# Patient Record
Sex: FEMALE | Race: OTHER | HISPANIC OR LATINO | Employment: UNEMPLOYED | URBAN - METROPOLITAN AREA
[De-identification: names, ages, dates, MRNs, and addresses within clinical notes are randomized per-mention and may not be internally consistent; named-entity substitution may affect disease eponyms.]

---

## 2019-01-21 ENCOUNTER — HOSPITAL ENCOUNTER (EMERGENCY)
Facility: HOSPITAL | Age: 24
Discharge: HOME | End: 2019-01-21
Attending: EMERGENCY MEDICINE | Admitting: EMERGENCY MEDICINE
Payer: COMMERCIAL

## 2019-01-21 ENCOUNTER — APPOINTMENT (EMERGENCY)
Dept: RADIOLOGY | Facility: HOSPITAL | Age: 24
End: 2019-01-21
Payer: COMMERCIAL

## 2019-01-21 VITALS
OXYGEN SATURATION: 96 % | RESPIRATION RATE: 18 BRPM | HEART RATE: 85 BPM | SYSTOLIC BLOOD PRESSURE: 114 MMHG | TEMPERATURE: 98.4 F | DIASTOLIC BLOOD PRESSURE: 55 MMHG | WEIGHT: 262 LBS

## 2019-01-21 DIAGNOSIS — R05.9 COUGH: ICD-10-CM

## 2019-01-21 DIAGNOSIS — R09.89 RUNNY NOSE: ICD-10-CM

## 2019-01-21 DIAGNOSIS — R19.7 DIARRHEA: ICD-10-CM

## 2019-01-21 DIAGNOSIS — R51.9 HEADACHE: ICD-10-CM

## 2019-01-21 DIAGNOSIS — R11.2 NAUSEA AND VOMITING: Primary | ICD-10-CM

## 2019-01-21 LAB
ALBUMIN SERPL BCP-MCNC: 3.6 G/DL (ref 3.5–5)
ALP SERPL-CCNC: 116 U/L (ref 46–116)
ALT SERPL W P-5'-P-CCNC: 51 U/L (ref 12–78)
ANION GAP SERPL CALCULATED.3IONS-SCNC: 11 MMOL/L (ref 4–13)
AST SERPL W P-5'-P-CCNC: 29 U/L (ref 5–45)
ATRIAL RATE: 108 BPM
BASOPHILS # BLD AUTO: 0.02 THOUSANDS/ΜL (ref 0–0.1)
BASOPHILS NFR BLD AUTO: 0 % (ref 0–1)
BILIRUB SERPL-MCNC: 0.25 MG/DL (ref 0.2–1)
BILIRUB UR QL STRIP: NEGATIVE
BUN SERPL-MCNC: 21 MG/DL (ref 5–25)
CALCIUM SERPL-MCNC: 9.3 MG/DL (ref 8.3–10.1)
CHLORIDE SERPL-SCNC: 105 MMOL/L (ref 100–108)
CLARITY UR: CLEAR
CO2 SERPL-SCNC: 28 MMOL/L (ref 21–32)
COLOR UR: YELLOW
COLOR, POC: YELLOW
CREAT SERPL-MCNC: 0.95 MG/DL (ref 0.6–1.3)
EOSINOPHIL # BLD AUTO: 0.33 THOUSAND/ΜL (ref 0–0.61)
EOSINOPHIL NFR BLD AUTO: 3 % (ref 0–6)
ERYTHROCYTE [DISTWIDTH] IN BLOOD BY AUTOMATED COUNT: 12.9 % (ref 11.6–15.1)
EXT PREG TEST URINE: NEGATIVE
GFR SERPL CREATININE-BSD FRML MDRD: 85 ML/MIN/1.73SQ M
GLUCOSE SERPL-MCNC: 96 MG/DL (ref 65–140)
GLUCOSE UR STRIP-MCNC: NEGATIVE MG/DL
HCT VFR BLD AUTO: 38.5 % (ref 34.8–46.1)
HGB BLD-MCNC: 12.2 G/DL (ref 11.5–15.4)
HGB UR QL STRIP.AUTO: NEGATIVE
IMM GRANULOCYTES # BLD AUTO: 0.05 THOUSAND/UL (ref 0–0.2)
IMM GRANULOCYTES NFR BLD AUTO: 0 % (ref 0–2)
KETONES UR STRIP-MCNC: NEGATIVE MG/DL
LEUKOCYTE ESTERASE UR QL STRIP: NEGATIVE
LIPASE SERPL-CCNC: 157 U/L (ref 73–393)
LYMPHOCYTES # BLD AUTO: 2.58 THOUSANDS/ΜL (ref 0.6–4.47)
LYMPHOCYTES NFR BLD AUTO: 21 % (ref 14–44)
MCH RBC QN AUTO: 27.9 PG (ref 26.8–34.3)
MCHC RBC AUTO-ENTMCNC: 31.7 G/DL (ref 31.4–37.4)
MCV RBC AUTO: 88 FL (ref 82–98)
MONOCYTES # BLD AUTO: 0.51 THOUSAND/ΜL (ref 0.17–1.22)
MONOCYTES NFR BLD AUTO: 4 % (ref 4–12)
NEUTROPHILS # BLD AUTO: 8.66 THOUSANDS/ΜL (ref 1.85–7.62)
NEUTS SEG NFR BLD AUTO: 72 % (ref 43–75)
NITRITE UR QL STRIP: NEGATIVE
NRBC BLD AUTO-RTO: 0 /100 WBCS
P AXIS: 58 DEGREES
PH UR STRIP.AUTO: 7 [PH] (ref 4.5–8)
PLATELET # BLD AUTO: 238 THOUSANDS/UL (ref 149–390)
PMV BLD AUTO: 11.3 FL (ref 8.9–12.7)
POTASSIUM SERPL-SCNC: 3.4 MMOL/L (ref 3.5–5.3)
PR INTERVAL: 154 MS
PROT SERPL-MCNC: 8.6 G/DL (ref 6.4–8.2)
PROT UR STRIP-MCNC: NEGATIVE MG/DL
QRS AXIS: 55 DEGREES
QRSD INTERVAL: 76 MS
QT INTERVAL: 338 MS
QTC INTERVAL: 452 MS
RBC # BLD AUTO: 4.37 MILLION/UL (ref 3.81–5.12)
SODIUM SERPL-SCNC: 144 MMOL/L (ref 136–145)
SP GR UR STRIP.AUTO: 1.02 (ref 1–1.03)
T WAVE AXIS: 15 DEGREES
UROBILINOGEN UR QL STRIP.AUTO: 1 E.U./DL
VENTRICULAR RATE: 108 BPM
WBC # BLD AUTO: 12.15 THOUSAND/UL (ref 4.31–10.16)

## 2019-01-21 PROCEDURE — 99284 EMERGENCY DEPT VISIT MOD MDM: CPT

## 2019-01-21 PROCEDURE — 80053 COMPREHEN METABOLIC PANEL: CPT | Performed by: EMERGENCY MEDICINE

## 2019-01-21 PROCEDURE — 83690 ASSAY OF LIPASE: CPT | Performed by: EMERGENCY MEDICINE

## 2019-01-21 PROCEDURE — 93010 ELECTROCARDIOGRAM REPORT: CPT | Performed by: INTERNAL MEDICINE

## 2019-01-21 PROCEDURE — 96374 THER/PROPH/DIAG INJ IV PUSH: CPT

## 2019-01-21 PROCEDURE — 81025 URINE PREGNANCY TEST: CPT | Performed by: EMERGENCY MEDICINE

## 2019-01-21 PROCEDURE — 81003 URINALYSIS AUTO W/O SCOPE: CPT

## 2019-01-21 PROCEDURE — 96361 HYDRATE IV INFUSION ADD-ON: CPT

## 2019-01-21 PROCEDURE — 85025 COMPLETE CBC W/AUTO DIFF WBC: CPT | Performed by: EMERGENCY MEDICINE

## 2019-01-21 PROCEDURE — 36415 COLL VENOUS BLD VENIPUNCTURE: CPT | Performed by: EMERGENCY MEDICINE

## 2019-01-21 PROCEDURE — 71046 X-RAY EXAM CHEST 2 VIEWS: CPT

## 2019-01-21 PROCEDURE — 93005 ELECTROCARDIOGRAM TRACING: CPT

## 2019-01-21 RX ORDER — DICYCLOMINE HCL 20 MG
20 TABLET ORAL 2 TIMES DAILY
Qty: 10 TABLET | Refills: 0 | Status: SHIPPED | OUTPATIENT
Start: 2019-01-21 | End: 2019-01-21

## 2019-01-21 RX ORDER — POTASSIUM CHLORIDE 20 MEQ/1
20 TABLET, EXTENDED RELEASE ORAL ONCE
Status: COMPLETED | OUTPATIENT
Start: 2019-01-21 | End: 2019-01-21

## 2019-01-21 RX ORDER — METOCLOPRAMIDE HYDROCHLORIDE 5 MG/ML
10 INJECTION INTRAMUSCULAR; INTRAVENOUS ONCE
Status: COMPLETED | OUTPATIENT
Start: 2019-01-21 | End: 2019-01-21

## 2019-01-21 RX ORDER — MAGNESIUM HYDROXIDE/ALUMINUM HYDROXICE/SIMETHICONE 120; 1200; 1200 MG/30ML; MG/30ML; MG/30ML
30 SUSPENSION ORAL ONCE
Status: COMPLETED | OUTPATIENT
Start: 2019-01-21 | End: 2019-01-21

## 2019-01-21 RX ORDER — METOCLOPRAMIDE 10 MG/1
10 TABLET ORAL EVERY 6 HOURS
Qty: 10 TABLET | Refills: 0 | Status: SHIPPED | OUTPATIENT
Start: 2019-01-21 | End: 2020-06-18

## 2019-01-21 RX ORDER — NAPROXEN 500 MG/1
500 TABLET ORAL 2 TIMES DAILY WITH MEALS
Qty: 10 TABLET | Refills: 0 | Status: SHIPPED | OUTPATIENT
Start: 2019-01-21 | End: 2020-06-18

## 2019-01-21 RX ADMIN — POTASSIUM CHLORIDE 20 MEQ: 1500 TABLET, EXTENDED RELEASE ORAL at 17:20

## 2019-01-21 RX ADMIN — ALUMINUM HYDROXIDE, MAGNESIUM HYDROXIDE, AND SIMETHICONE 30 ML: 200; 200; 20 SUSPENSION ORAL at 16:37

## 2019-01-21 RX ADMIN — LIDOCAINE HYDROCHLORIDE 15 ML: 20 SOLUTION ORAL; TOPICAL at 16:37

## 2019-01-21 RX ADMIN — METOCLOPRAMIDE 10 MG: 5 INJECTION, SOLUTION INTRAMUSCULAR; INTRAVENOUS at 16:33

## 2019-01-21 RX ADMIN — SODIUM CHLORIDE 1000 ML: 0.9 INJECTION, SOLUTION INTRAVENOUS at 16:33

## 2019-01-21 NOTE — ED PROVIDER NOTES
History  Chief Complaint   Patient presents with    Flu Symptoms     patient c/o of nasal congestion,chest pain, and diarrhea that started Saturday     Pt's family is in the room next door after coming in by ambulance for another complaint  Pt having flu like symptoms x 2 days  History provided by:  Patient   used: Yes (Family)    Flu Symptoms   Presenting symptoms: cough, diarrhea, headache, nausea, rhinorrhea and vomiting    Presenting symptoms: no fever, no myalgias, no shortness of breath and no sore throat    Duration:  2 days  Progression:  Unchanged  Chronicity:  New  Relieved by:  None tried  Worsened by:  Nothing  Ineffective treatments:  None tried  Associated symptoms: nasal congestion    Associated symptoms: no chills and no ear pain        None       Past Medical History:   Diagnosis Date    Asthma     Hyperlipidemia        History reviewed  No pertinent surgical history  History reviewed  No pertinent family history  I have reviewed and agree with the history as documented  Social History   Substance Use Topics    Smoking status: Never Smoker    Smokeless tobacco: Never Used    Alcohol use No        Review of Systems   Constitutional: Negative for chills and fever  HENT: Positive for congestion, rhinorrhea and sinus pressure  Negative for ear discharge, ear pain, postnasal drip, sneezing, sore throat and trouble swallowing  Eyes: Negative for discharge and redness  Respiratory: Positive for cough  Negative for shortness of breath  Cardiovascular: Negative for chest pain  Gastrointestinal: Positive for abdominal pain (Upper), diarrhea, nausea and vomiting  Musculoskeletal: Negative for myalgias  Skin: Negative for rash  Neurological: Positive for headaches  All other systems reviewed and are negative  Physical Exam  Physical Exam   Constitutional: She is oriented to person, place, and time  She appears well-developed and well-nourished  Non-toxic appearance  She does not have a sickly appearance  She does not appear ill  HENT:   Head: Normocephalic and atraumatic  Right Ear: Tympanic membrane normal  No drainage  Tympanic membrane is not injected, not erythematous and not bulging  No middle ear effusion  Left Ear: Tympanic membrane normal  No drainage  Tympanic membrane is not injected, not erythematous and not bulging  No middle ear effusion  Nose: Nose normal    Mouth/Throat: Oropharynx is clear and moist  No oropharyngeal exudate  Eyes: Conjunctivae are normal  Right eye exhibits no discharge  Left eye exhibits no discharge  Right conjunctiva is not injected  Left conjunctiva is not injected  Neck: Normal range of motion  Neck supple  No neck rigidity  Cardiovascular: Normal rate, regular rhythm, normal heart sounds and intact distal pulses  Exam reveals no gallop and no friction rub  No murmur heard  Pulmonary/Chest: Effort normal and breath sounds normal  No stridor  No respiratory distress  She has no wheezes  She has no rales  Abdominal: Soft  Bowel sounds are normal  She exhibits no distension  There is tenderness in the right upper quadrant, epigastric area and left upper quadrant  There is no rebound, no guarding, no tenderness at McBurney's point and negative Poe's sign  Musculoskeletal: She exhibits no edema  Lymphadenopathy:     She has no cervical adenopathy  Neurological: She is alert and oriented to person, place, and time  Skin: Skin is warm and dry  No rash noted  No pallor  Nursing note and vitals reviewed        Vital Signs  ED Triage Vitals [01/21/19 1541]   Temperature Pulse Respirations Blood Pressure SpO2   98 4 °F (36 9 °C) 104 20 164/90 98 %      Temp Source Heart Rate Source Patient Position - Orthostatic VS BP Location FiO2 (%)   Oral Monitor Sitting Right arm --      Pain Score       7           Vitals:    01/21/19 1541 01/21/19 1840   BP: 164/90 114/55   Pulse: 104 85   Patient Position - Orthostatic VS: Sitting Lying       Visual Acuity      ED Medications  Medications   aluminum-magnesium hydroxide-simethicone (MYLANTA) 200-200-20 mg/5 mL oral suspension 30 mL (30 mL Oral Given 1/21/19 1637)   lidocaine viscous (XYLOCAINE) 2 % mucosal solution 15 mL (15 mL Swish & Spit Given 1/21/19 1637)   sodium chloride 0 9 % bolus 1,000 mL (0 mL Intravenous Stopped 1/21/19 2003)   metoclopramide (REGLAN) injection 10 mg (10 mg Intravenous Given 1/21/19 1633)   potassium chloride (K-DUR,KLOR-CON) CR tablet 20 mEq (20 mEq Oral Given 1/21/19 1720)       Diagnostic Studies  Results Reviewed     Procedure Component Value Units Date/Time    POCT pregnancy, urine [[id number]]  (Normal) Resulted:  01/21/19 2003    Lab Status:  Final result Updated:  01/21/19 2003     EXT PREG TEST UR (Ref: Negative) negative    POCT urinalysis dipstick [[id number]]  (Normal) Resulted:  01/21/19 1958    Lab Status:  Final result Specimen:  Urine Updated:  01/21/19 2003     Color, UA yellow    ED Urine Macroscopic [[id number]] Collected:  01/21/19 2015    Lab Status:  Final result Specimen:  Urine Updated:  01/21/19 1958     Color, UA Yellow     Clarity, UA Clear     pH, UA 7 0     Leukocytes, UA Negative     Nitrite, UA Negative     Protein, UA Negative mg/dl      Glucose, UA Negative mg/dl      Ketones, UA Negative mg/dl      Urobilinogen, UA 1 0 E U /dl      Bilirubin, UA Negative     Blood, UA Negative     Specific Gravity, UA 1 020    Narrative:       CLINITEK RESULT    Comprehensive metabolic panel [[id number]]  (Abnormal) Collected:  01/21/19 1632    Lab Status:  Final result Specimen:  Blood from Arm, Left Updated:  01/21/19 1702     Sodium 144 mmol/L      Potassium 3 4 (L) mmol/L      Chloride 105 mmol/L      CO2 28 mmol/L      ANION GAP 11 mmol/L      BUN 21 mg/dL      Creatinine 0 95 mg/dL      Glucose 96 mg/dL      Calcium 9 3 mg/dL      AST 29 U/L      ALT 51 U/L      Alkaline Phosphatase 116 U/L      Total Protein 8 6 (H) g/dL      Albumin 3 6 g/dL      Total Bilirubin 0 25 mg/dL      eGFR 85 ml/min/1 73sq m     Narrative:         National Kidney Disease Education Program recommendations are as follows:  GFR calculation is accurate only with a steady state creatinine  Chronic Kidney disease less than 60 ml/min/1 73 sq  meters  Kidney failure less than 15 ml/min/1 73 sq  meters  Lipase [[id number]]  (Normal) Collected:  01/21/19 1632    Lab Status:  Final result Specimen:  Blood from Arm, Left Updated:  01/21/19 1702     Lipase 157 u/L     CBC and differential [[id number]]  (Abnormal) Collected:  01/21/19 1632    Lab Status:  Final result Specimen:  Blood from Arm, Left Updated:  01/21/19 1647     WBC 12 15 (H) Thousand/uL      RBC 4 37 Million/uL      Hemoglobin 12 2 g/dL      Hematocrit 38 5 %      MCV 88 fL      MCH 27 9 pg      MCHC 31 7 g/dL      RDW 12 9 %      MPV 11 3 fL      Platelets 375 Thousands/uL      nRBC 0 /100 WBCs      Neutrophils Relative 72 %      Immat GRANS % 0 %      Lymphocytes Relative 21 %      Monocytes Relative 4 %      Eosinophils Relative 3 %      Basophils Relative 0 %      Neutrophils Absolute 8 66 (H) Thousands/µL      Immature Grans Absolute 0 05 Thousand/uL      Lymphocytes Absolute 2 58 Thousands/µL      Monocytes Absolute 0 51 Thousand/µL      Eosinophils Absolute 0 33 Thousand/µL      Basophils Absolute 0 02 Thousands/µL                  XR chest 2 views   ED Interpretation by John Quinn 24, DO (01/21 1627)   No acute abnormalities      Final Result by Patito Zimmer MD (01/21 1647)      No acute cardiopulmonary disease              Workstation performed: [id number]                    Procedures  ECG 12 Lead Documentation  Date/Time: 1/21/2019 4:28 PM  Performed by: Thania Weaver  Authorized by: Thania Weaver     Indications / Diagnosis:  Cough  ECG reviewed by me, the ED Provider: yes    Patient location:  Bedside  Rate:     ECG rate:  108    ECG rate assessment: tachycardic Rhythm:     Rhythm: sinus tachycardia    Ectopy:     Ectopy: none    QRS:     QRS axis:  Normal    QRS intervals:  Normal  ST segments:     ST segments:  Normal  T waves:     T waves: normal             Phone Contacts  ED Phone Contact    ED Course  ED Course as of Jan 21 2011 Mon Jan 21, 2019   1704 Will replete Potassium: (!) 3 4   1727 Pt states she's feeling better  A/w urine sample  MDM  Number of Diagnoses or Management Options     Amount and/or Complexity of Data Reviewed  Clinical lab tests: reviewed and ordered  Tests in the radiology section of CPT®: ordered and reviewed  Tests in the medicine section of CPT®: ordered and reviewed      CritCare Time    Disposition  Final diagnoses:   Nausea and vomiting   Diarrhea   Cough   Runny nose   Headache     Time reflects when diagnosis was documented in both MDM as applicable and the Disposition within this note     Time User Action Codes Description Comment    1/21/2019  7:18 PM Alisson Rodriguez 48 [R11 2] Nausea and vomiting     1/21/2019  7:18 PM Rl Cedeno [R19 7] Diarrhea     1/21/2019  7:18 PM Smitha Garcia Add [R05] Cough     1/21/2019  7:18 PM Rl Cedeno [R09 89] Runny nose     1/21/2019  7:18 PM Kristie Rodriguez [address] [place] [address] Headache       ED Disposition     ED Disposition Condition Comment    Discharge  [facility] discharge to home/self care      Condition at discharge: Good        Follow-up Information    None         Patient's Medications   Discharge Prescriptions    DICYCLOMINE (BENTYL) 20 MG TABLET    Take 1 tablet (20 mg total) by mouth 2 (two) times a day       Start Date: 1/21/2019 End Date: --       Order Dose: 20 mg       Quantity: 10 tablet    Refills: 0    METOCLOPRAMIDE (REGLAN) 10 MG TABLET    Take 1 tablet (10 mg total) by mouth every 6 (six) hours       Start Date: 1/21/2019 End Date: --       Order Dose: 10 mg       Quantity: 10 tablet    Refills: 0    NAPROXEN (NAPROSYN) 500 MG TABLET    Take 1 tablet (500 mg total) by mouth 2 (two) times a day with meals       Start Date: 1/21/2019 End Date: --       Order Dose: 500 mg       Quantity: 10 tablet    Refills: 0     No discharge procedures on file      ED Provider  Electronically Signed by           Cadence Cohn DO  01/21/19 2011

## 2019-01-22 NOTE — DISCHARGE INSTRUCTIONS
Gastroenteritis   LO QUE NECESITA SABER:   La gastroenteritis, o gripe estomacal, es audrey infección del estómago y los intestinos  INSTRUCCIONES SOBRE EL TOM HOSPITALARIA:   Llame al 911 en eileen de presentar lo siguiente:   · Usted tiene dificultad para respirar o pulso acelerado  Regrese a la eddie de emergencias si:   · Usted ve karis en cai diarrea  · Usted no puede dejar de vomitar  · Usted no ha orinado en 12 horas  · Usted siente que se va a desmayar  Pregúntele a cai Shahid Shady vitaminas y minerales son adecuados para usted  · Usted tiene fiebre  · Usted continúa con vómitos o diarrea aún después del tratamiento  · Usted ve lombrices en cai diarrea  · Cai boca u ojos están secos  Usted no está orinando tanto o con la misma frecuencia  · Usted tiene preguntas o inquietudes acerca de cai condición o cuidado  Medicamentos:   · Medicamentos,  se pueden administrar para [employer] vómitos o la diarrea, disminuir los calambres abdominales o tratar audrey infección  · [place] kerri medicamentos sujey se le haya indicado  Consulte con cai médico si usted concha que cai medicamento no le está ayudando o si presenta efectos secundarios  Infórmele si es alérgico a cualquier medicamento  Mantenga audrey lista actualizada de los [place], las vitaminas y los productos herbales que rani  Incluya los siguientes datos de los medicamentos: cantidad, frecuencia y motivo de administración  Traiga con usted la lista o los envases de la píldoras a kerri citas de seguimiento  Lleve la lista de los medicamentos con usted en eileen de audrey emergencia  [place] de cai síntomas:   · [place] líquidos sujey se le haya indicado  Pregunte a cai médico qué cantidad de líquido debe beber a diario y qué líquidos le recomienda  Es posible que también necesite vincenzo audrey solución de rehidratación oral (SRO)   Audrey SRO contiene la cantidad [place] de azúcar, sal y minerales en agua para reponer los líquidos corporales  · Consuma alimentos blandos  Cuando usted sienta [place], empiece a comer alimentos suaves y blandos  Ejemplos son los plátanos, sopas claras, joey y puré de [place] mary[place]  No consuma productos lácteos, alcohol, bebidas azucaradas, o bebidas con cafeína hasta que se sienta mejor  · Descanse el mayor tiempo posible  Cuando se empiece a sentir mejor, comience poco a poco a hacer más cada día  Evite la propagación de la gastroenteritis:  La gastroenteritis se puede propagar fácilmente  Manténgase usted, cai aidan y kerri alrededores limpios para ayudar a evitar la propagación de la gastroenteritis:  · Lávese las alicia frecuentemente  Utilice agua y [place]  [employer] las alicia después de usar el baño, cambiarle el pañal a un nohelia o estornudar  Lávese las alicia antes de comer o preparar alimentos  · Limpie las superficies y lave la ropa con frecuencia  Lave cai ropa y kerri toallas por separado del allegra de la ropa  Limpie las superficies de cai hogar con limpiador antibacterial o con blanqueador  · Lave y cocine zeinab los alimentos  Lave las verduras crudas antes de cocinar  [address] y [place]  No utilice los mismos platos para las jennifer crudas que para otros alimentos  Ponga en el refrigerador inmediatamente cualquier alimento que haya sobrado  · Esté alerta cuando usted vaya de campamento o cuando viaje  Solamente tome agua limpia  No tome agua de los marietta o toshia a menos que usted purifique o hierva el agua negro  Cuando viaje, tome agua embotellada y no le ponga hielo  No coma fruta con la cáscara  No coma pescado crudo o jennifer que no están cocinadas completamente  Acuda a kerri consultas de control con cai médico según le indicaron  Anote kerri preguntas para que se acuerde de hacerlas latonya kerri visitas  © 2017 [address] Information is for End User's use only and may not be sold, redistributed or otherwise used for commercial purposes   All illustrations and images included in CareNotes® are the copyrighted property of A D A M , Inc  or Neo [employer]  Esta información es sólo para uso en educación  Cai intención no es darle un consejo médico sobre enfermedades o tratamientos  Colsulte con cai Aaliyah Jewels farmacéutico antes de seguir cualquier régimen médico para saber si es seguro y efectivo para usted

## 2020-06-18 ENCOUNTER — HOSPITAL ENCOUNTER (EMERGENCY)
Facility: HOSPITAL | Age: 25
Discharge: HOME | End: 2020-06-19
Attending: EMERGENCY MEDICINE | Admitting: EMERGENCY MEDICINE
Payer: COMMERCIAL

## 2020-06-18 DIAGNOSIS — Z3A.01 LESS THAN 8 WEEKS GESTATION OF PREGNANCY: Primary | ICD-10-CM

## 2020-06-18 DIAGNOSIS — R07.89 ATYPICAL CHEST PAIN: ICD-10-CM

## 2020-06-18 PROCEDURE — 99285 EMERGENCY DEPT VISIT HI MDM: CPT

## 2020-06-18 RX ORDER — NIFEDIPINE 30 MG/1
30 TABLET, EXTENDED RELEASE ORAL DAILY
COMMUNITY
Start: 2020-04-13 | End: 2021-04-13

## 2020-06-18 RX ORDER — PROPRANOLOL HYDROCHLORIDE 80 MG/1
80 TABLET ORAL 2 TIMES DAILY
COMMUNITY
Start: 2020-01-27 | End: 2021-01-26

## 2020-06-19 VITALS
TEMPERATURE: 97 F | SYSTOLIC BLOOD PRESSURE: 142 MMHG | WEIGHT: 278 LBS | RESPIRATION RATE: 18 BRPM | DIASTOLIC BLOOD PRESSURE: 83 MMHG | HEART RATE: 74 BPM | OXYGEN SATURATION: 98 %

## 2020-06-19 LAB
ALBUMIN SERPL BCP-MCNC: 3.7 G/DL (ref 3–5.2)
ALP SERPL-CCNC: 74 U/L (ref 43–122)
ALT SERPL W P-5'-P-CCNC: 33 U/L (ref 9–52)
ANION GAP SERPL CALCULATED.3IONS-SCNC: 8 MMOL/L (ref 5–14)
AST SERPL W P-5'-P-CCNC: 26 U/L (ref 14–36)
ATRIAL RATE: 73 BPM
ATRIAL RATE: 75 BPM
B-HCG SERPL-ACNC: 1378.6 MIU/ML
BASOPHILS # BLD AUTO: 0 THOUSANDS/ΜL (ref 0–0.1)
BASOPHILS NFR BLD AUTO: 0 % (ref 0–1)
BILIRUB DIRECT SERPL-MCNC: 0.1 MG/DL
BILIRUB SERPL-MCNC: 0.4 MG/DL
BILIRUB UR QL STRIP: NEGATIVE
BUN SERPL-MCNC: 19 MG/DL (ref 5–25)
CALCIUM SERPL-MCNC: 8.9 MG/DL (ref 8.4–10.2)
CHLORIDE SERPL-SCNC: 105 MMOL/L (ref 97–108)
CLARITY UR: CLEAR
CO2 SERPL-SCNC: 22 MMOL/L (ref 22–30)
COLOR UR: NORMAL
CREAT SERPL-MCNC: 0.7 MG/DL (ref 0.6–1.2)
D DIMER PPP FEU-MCNC: 0.46 UG/ML FEU
EOSINOPHIL # BLD AUTO: 0.1 THOUSAND/ΜL (ref 0–0.4)
EOSINOPHIL NFR BLD AUTO: 1 % (ref 0–6)
ERYTHROCYTE [DISTWIDTH] IN BLOOD BY AUTOMATED COUNT: 15.8 %
EXT PREG TEST URINE: POSITIVE
EXT. CONTROL ED NAV: ABNORMAL
GFR SERPL CREATININE-BSD FRML MDRD: 121 ML/MIN/1.73SQ M
GLUCOSE SERPL-MCNC: 111 MG/DL (ref 70–99)
GLUCOSE UR STRIP-MCNC: NEGATIVE MG/DL
HCT VFR BLD AUTO: 33.5 % (ref 36–46)
HGB BLD-MCNC: 11.2 G/DL (ref 12–16)
HGB UR QL STRIP.AUTO: NEGATIVE
KETONES UR STRIP-MCNC: NEGATIVE MG/DL
LEUKOCYTE ESTERASE UR QL STRIP: NEGATIVE
LIPASE SERPL-CCNC: 131 U/L (ref 23–300)
LYMPHOCYTES # BLD AUTO: 2.5 THOUSANDS/ΜL (ref 0.5–4)
LYMPHOCYTES NFR BLD AUTO: 32 % (ref 25–45)
MCH RBC QN AUTO: 27.5 PG (ref 26–34)
MCHC RBC AUTO-ENTMCNC: 33.5 G/DL (ref 31–36)
MCV RBC AUTO: 82 FL (ref 80–100)
MONOCYTES # BLD AUTO: 0.4 THOUSAND/ΜL (ref 0.2–0.9)
MONOCYTES NFR BLD AUTO: 5 % (ref 1–10)
NEUTROPHILS # BLD AUTO: 5 THOUSANDS/ΜL (ref 1.8–7.8)
NEUTS SEG NFR BLD AUTO: 62 % (ref 45–65)
NITRITE UR QL STRIP: NEGATIVE
P AXIS: 33 DEGREES
P AXIS: 47 DEGREES
PH UR STRIP.AUTO: 6.5 [PH]
PLATELET # BLD AUTO: 196 THOUSANDS/UL (ref 150–450)
PMV BLD AUTO: 8.9 FL (ref 8.9–12.7)
POTASSIUM SERPL-SCNC: 3.3 MMOL/L (ref 3.6–5)
PR INTERVAL: 162 MS
PR INTERVAL: 166 MS
PROT SERPL-MCNC: 7.6 G/DL (ref 5.9–8.4)
PROT UR STRIP-MCNC: NEGATIVE MG/DL
QRS AXIS: 27 DEGREES
QRS AXIS: 32 DEGREES
QRSD INTERVAL: 86 MS
QRSD INTERVAL: 88 MS
QT INTERVAL: 396 MS
QT INTERVAL: 396 MS
QTC INTERVAL: 436 MS
QTC INTERVAL: 442 MS
RBC # BLD AUTO: 4.09 MILLION/UL (ref 4–5.2)
SODIUM SERPL-SCNC: 135 MMOL/L (ref 137–147)
SP GR UR STRIP.AUTO: 1.02 (ref 1–1.04)
T WAVE AXIS: 11 DEGREES
T WAVE AXIS: 19 DEGREES
TROPONIN I SERPL-MCNC: <0.01 NG/ML (ref 0–0.03)
UROBILINOGEN UA: NEGATIVE MG/DL
VENTRICULAR RATE: 73 BPM
VENTRICULAR RATE: 75 BPM
WBC # BLD AUTO: 8 THOUSAND/UL (ref 4.5–11)

## 2020-06-19 PROCEDURE — 84702 CHORIONIC GONADOTROPIN TEST: CPT | Performed by: PHYSICIAN ASSISTANT

## 2020-06-19 PROCEDURE — 93010 ELECTROCARDIOGRAM REPORT: CPT | Performed by: INTERNAL MEDICINE

## 2020-06-19 PROCEDURE — 80053 COMPREHEN METABOLIC PANEL: CPT | Performed by: PHYSICIAN ASSISTANT

## 2020-06-19 PROCEDURE — 84484 ASSAY OF TROPONIN QUANT: CPT | Performed by: PHYSICIAN ASSISTANT

## 2020-06-19 PROCEDURE — 36415 COLL VENOUS BLD VENIPUNCTURE: CPT | Performed by: PHYSICIAN ASSISTANT

## 2020-06-19 PROCEDURE — 83690 ASSAY OF LIPASE: CPT | Performed by: PHYSICIAN ASSISTANT

## 2020-06-19 PROCEDURE — 82248 BILIRUBIN DIRECT: CPT | Performed by: PHYSICIAN ASSISTANT

## 2020-06-19 PROCEDURE — 85379 FIBRIN DEGRADATION QUANT: CPT | Performed by: PHYSICIAN ASSISTANT

## 2020-06-19 PROCEDURE — 99284 EMERGENCY DEPT VISIT MOD MDM: CPT | Performed by: PHYSICIAN ASSISTANT

## 2020-06-19 PROCEDURE — 96374 THER/PROPH/DIAG INJ IV PUSH: CPT

## 2020-06-19 PROCEDURE — 85025 COMPLETE CBC W/AUTO DIFF WBC: CPT | Performed by: PHYSICIAN ASSISTANT

## 2020-06-19 PROCEDURE — 93005 ELECTROCARDIOGRAM TRACING: CPT

## 2020-06-19 PROCEDURE — 81025 URINE PREGNANCY TEST: CPT | Performed by: PHYSICIAN ASSISTANT

## 2020-06-19 RX ORDER — METOCLOPRAMIDE HYDROCHLORIDE 5 MG/ML
5 INJECTION INTRAMUSCULAR; INTRAVENOUS ONCE
Status: COMPLETED | OUTPATIENT
Start: 2020-06-19 | End: 2020-06-19

## 2020-06-19 RX ORDER — FAMOTIDINE 20 MG
1 TABLET ORAL DAILY
Qty: 30 EACH | Refills: 0 | Status: SHIPPED | OUTPATIENT
Start: 2020-06-19

## 2020-06-19 RX ORDER — ACETAMINOPHEN 325 MG/1
650 TABLET ORAL ONCE
Status: COMPLETED | OUTPATIENT
Start: 2020-06-19 | End: 2020-06-19

## 2020-06-19 RX ORDER — KETOROLAC TROMETHAMINE 30 MG/ML
15 INJECTION, SOLUTION INTRAMUSCULAR; INTRAVENOUS ONCE
Status: DISCONTINUED | OUTPATIENT
Start: 2020-06-19 | End: 2020-06-19

## 2020-06-19 RX ADMIN — METOCLOPRAMIDE 5 MG: 5 INJECTION, SOLUTION INTRAMUSCULAR; INTRAVENOUS at 00:56

## 2020-06-19 RX ADMIN — ACETAMINOPHEN 650 MG: 325 TABLET ORAL at 01:29

## 2021-07-16 ENCOUNTER — HOSPITAL ENCOUNTER (EMERGENCY)
Facility: HOSPITAL | Age: 26
Discharge: HOME | End: 2021-07-16
Attending: EMERGENCY MEDICINE | Admitting: EMERGENCY MEDICINE
Payer: COMMERCIAL

## 2021-07-16 VITALS
RESPIRATION RATE: 18 BRPM | OXYGEN SATURATION: 100 % | DIASTOLIC BLOOD PRESSURE: 113 MMHG | SYSTOLIC BLOOD PRESSURE: 175 MMHG | TEMPERATURE: 97.1 F | HEART RATE: 93 BPM

## 2021-07-16 DIAGNOSIS — T78.40XA ALLERGY, INITIAL ENCOUNTER: Primary | ICD-10-CM

## 2021-07-16 PROCEDURE — 99282 EMERGENCY DEPT VISIT SF MDM: CPT

## 2021-07-16 PROCEDURE — 96375 TX/PRO/DX INJ NEW DRUG ADDON: CPT

## 2021-07-16 PROCEDURE — 99284 EMERGENCY DEPT VISIT MOD MDM: CPT | Performed by: EMERGENCY MEDICINE

## 2021-07-16 PROCEDURE — 96374 THER/PROPH/DIAG INJ IV PUSH: CPT

## 2021-07-16 RX ORDER — METHYLPREDNISOLONE SODIUM SUCCINATE 125 MG/2ML
125 INJECTION, POWDER, LYOPHILIZED, FOR SOLUTION INTRAMUSCULAR; INTRAVENOUS ONCE
Status: COMPLETED | OUTPATIENT
Start: 2021-07-16 | End: 2021-07-16

## 2021-07-16 RX ORDER — PREDNISONE 20 MG/1
60 TABLET ORAL DAILY
Qty: 5 TABLET | Refills: 0 | Status: SHIPPED | OUTPATIENT
Start: 2021-07-16

## 2021-07-16 RX ORDER — FAMOTIDINE 20 MG/1
20 TABLET, FILM COATED ORAL 2 TIMES DAILY
Qty: 20 TABLET | Refills: 0 | Status: SHIPPED | OUTPATIENT
Start: 2021-07-16 | End: 2021-07-26

## 2021-07-16 RX ORDER — DIPHENHYDRAMINE HYDROCHLORIDE 50 MG/ML
25 INJECTION INTRAMUSCULAR; INTRAVENOUS ONCE
Status: COMPLETED | OUTPATIENT
Start: 2021-07-16 | End: 2021-07-16

## 2021-07-16 RX ADMIN — FAMOTIDINE 20 MG: 10 INJECTION INTRAVENOUS at 19:17

## 2021-07-16 RX ADMIN — METHYLPREDNISOLONE SODIUM SUCCINATE 125 MG: 125 INJECTION, POWDER, FOR SOLUTION INTRAMUSCULAR; INTRAVENOUS at 19:19

## 2021-07-16 RX ADMIN — DIPHENHYDRAMINE HYDROCHLORIDE 25 MG: 50 INJECTION, SOLUTION INTRAMUSCULAR; INTRAVENOUS at 19:15

## 2021-07-16 NOTE — ED PROVIDER NOTES
History  Chief Complaint   Patient presents with    Itching     pt states she ate seafood at work and feels itchy all over body and in throat, "lungs are itchy" has never had a reaction to seafood before  pt took benadryl at 345  some pressure in chest as well  Patient is a 68-year-old female who presents with acute onset generalized itching after eating seafood while at work  States he is eating seafood before with no issue  But states that this was a mixture of various fish  Did take a Benadryl with little relief  Denies any other exposures  No history of severe reaction or EpiPen administration  Difficulty breathing at this time  No difficulty swallowing          Prior to Admission Medications   Prescriptions Last Dose Informant Patient Reported? Taking? NIFEdipine (PROCARDIA XL) 30 mg 24 hr tablet   Yes No   Sig: Take 30 mg by mouth daily   Prenatal Vit-Fe Fumarate-FA (PRENATAL COMPLETE) 14-0 4 MG TABS   No No   Sig: Take 1 tablet by mouth daily   propranolol (INDERAL) 80 mg tablet   Yes No   Sig: Take 80 mg by mouth 2 (two) times a day      Facility-Administered Medications: None       Past Medical History:   Diagnosis Date    Abnormal CT of the head     Analgesic overuse headache     Asthma     Chronic pain of both shoulders     Hyperlipidemia     Obesity, morbid, BMI 50 or higher ([facility] [place] [address] )     Urethral diverticulum        Past Surgical History:   Procedure Laterality Date     SECTION         History reviewed  No pertinent family history  I have reviewed and agree with the history as documented  E-Cigarette/Vaping     E-Cigarette/Vaping Substances     Social History     Tobacco Use    Smoking status: Never Smoker    Smokeless tobacco: Never Used   Substance Use Topics    Alcohol use: No    Drug use: No       Review of Systems   Constitutional: Negative  HENT: Negative  Eyes: Negative  Respiratory: Negative  Cardiovascular: Negative  Gastrointestinal: Negative  Endocrine: Negative  Genitourinary: Negative  Musculoskeletal: Negative  Skin: Negative  Allergic/Immunologic: Positive for food allergies  Neurological: Negative  Hematological: Negative  Psychiatric/Behavioral: Negative  All other systems reviewed and are negative  Physical Exam  Physical Exam  Vitals and nursing note reviewed  Constitutional:       Appearance: Normal appearance  She is obese  HENT:      Head: Normocephalic and atraumatic  Right Ear: Tympanic membrane, ear canal and external ear normal       Left Ear: Tympanic membrane, ear canal and external ear normal       Nose: Nose normal       Mouth/Throat:      Mouth: Mucous membranes are moist       Pharynx: Oropharynx is clear  Cardiovascular:      Rate and Rhythm: Normal rate and regular rhythm  Pulses: Normal pulses  Pulmonary:      Effort: Pulmonary effort is normal       Breath sounds: Normal breath sounds  Abdominal:      General: Bowel sounds are normal       Palpations: Abdomen is soft  Musculoskeletal:         General: Normal range of motion  Cervical back: Normal range of motion and neck supple  Skin:     General: Skin is warm and dry  Capillary Refill: Capillary refill takes less than 2 seconds  Neurological:      General: No focal deficit present  Mental Status: She is alert and oriented to person, place, and time  Mental status is at baseline     Psychiatric:         Mood and Affect: Mood normal          Behavior: Behavior normal          Vital Signs  ED Triage Vitals   Temperature Pulse Respirations Blood Pressure SpO2   07/16/21 1901 07/16/21 1904 07/16/21 1904 07/16/21 1905 07/16/21 1904   (!) 97 1 °F (36 2 °C) 93 18 (!) 175/113 100 %      Temp Source Heart Rate Source Patient Position - Orthostatic VS BP Location FiO2 (%)   07/16/21 1901 07/16/21 1904 07/16/21 1904 07/16/21 1904 --   Tympanic Monitor Sitting Right arm       Pain Score       --                  Vitals: 07/16/21 1904 07/16/21 1905   BP:  (!) 175/113   Pulse: 93    Patient Position - Orthostatic VS: Sitting          Visual Acuity      ED Medications  Medications   diphenhydrAMINE (BENADRYL) injection 25 mg (25 mg Intravenous Given 7/16/21 1915)   methylPREDNISolone sodium succinate (Solu-MEDROL) injection 125 mg (125 mg Intravenous Given 7/16/21 1919)   famotidine (PEPCID) injection 20 mg (20 mg Intravenous Given 7/16/21 1917)       Diagnostic Studies  Results Reviewed     None                 No orders to display              Procedures  Procedures         ED Course                                           MDM  Number of Diagnoses or Management Options     Amount and/or Complexity of Data Reviewed  Obtain history from someone other than the patient: yes        Disposition  Final diagnoses: Allergy, initial encounter     Time reflects when diagnosis was documented in both MDM as applicable and the Disposition within this note     Time User Action Codes Description Comment    7/16/2021  8:15 PM Matt [address] [place] [address] Rd Allergy, initial encounter       ED Disposition     ED Disposition Condition Date/Time Comment    Discharge Stable Fri Jul 16, 2021  8:14 PM Ame Lomax discharge to home/self care              Follow-up Information     Follow up With Specialties Details Why Contact Info Additional [address]   [address], [facility] [address]  [address], [address], [address] [place], [place], [address] [place] [address]          Patient's Medications   Discharge Prescriptions    FAMOTIDINE (PEPCID) 20 MG TABLET    Take 1 tablet (20 mg total) by mouth 2 (two) times a day for 10 days       Start Date: 7/16/2021 End Date: 7/26/2021       Order Dose: 20 mg       Quantity: 20 tablet    Refills: 0    PREDNISONE 20 MG TABLET    Take 3 tablets (60 mg total) by mouth daily       Start Date: 7/16/2021 End Date: --       Order Dose: 60 mg       Quantity: 5 tablet    Refills: 0     No discharge procedures on file      PDMP Review     None          ED Provider  Electronically Signed by           Adonis Pemberton MD  07/16/21 1913       Adonis Pemberton MD  07/16/21 2017

## 2021-07-16 NOTE — Clinical Note
Trudymichelle Pavel was seen and treated in our emergency department on 7/16/2021  Diagnosis:     Jonathan Gonzalez  may return to work on return date  She may return on this date: 07/17/2021         If you have any questions or concerns, please don't hesitate to call        Dave Lane MD    ______________________________           _______________          _______________  Hospital Representative                              Date                                Time

## 2021-10-04 ENCOUNTER — HOSPITAL ENCOUNTER (EMERGENCY)
Facility: HOSPITAL | Age: 26
Discharge: HOME | End: 2021-10-04
Attending: EMERGENCY MEDICINE
Payer: COMMERCIAL

## 2021-10-04 VITALS
RESPIRATION RATE: 20 BRPM | HEART RATE: 78 BPM | WEIGHT: 277.34 LBS | DIASTOLIC BLOOD PRESSURE: 92 MMHG | OXYGEN SATURATION: 99 % | TEMPERATURE: 98.5 F | SYSTOLIC BLOOD PRESSURE: 143 MMHG

## 2021-10-04 DIAGNOSIS — Z20.822 PERSON UNDER INVESTIGATION FOR COVID-19: Primary | ICD-10-CM

## 2021-10-04 LAB
FLUAV RNA RESP QL NAA+PROBE: NEGATIVE
FLUBV RNA RESP QL NAA+PROBE: NEGATIVE
RSV RNA RESP QL NAA+PROBE: NEGATIVE
SARS-COV-2 RNA RESP QL NAA+PROBE: NEGATIVE

## 2021-10-04 PROCEDURE — 99284 EMERGENCY DEPT VISIT MOD MDM: CPT | Performed by: PHYSICIAN ASSISTANT

## 2021-10-04 PROCEDURE — 0241U HB NFCT DS VIR RESP RNA 4 TRGT: CPT | Performed by: PHYSICIAN ASSISTANT

## 2021-10-04 PROCEDURE — 99284 EMERGENCY DEPT VISIT MOD MDM: CPT

## 2021-10-04 RX ORDER — FEXOFENADINE HCL AND PSEUDOEPHEDRINE HCI 60; 120 MG/1; MG/1
1 TABLET, EXTENDED RELEASE ORAL EVERY 12 HOURS
Qty: 30 TABLET | Refills: 0 | Status: SHIPPED | OUTPATIENT
Start: 2021-10-04

## 2021-10-04 RX ORDER — BENZONATATE 100 MG/1
100 CAPSULE ORAL EVERY 8 HOURS
Qty: 21 CAPSULE | Refills: 0 | Status: SHIPPED | OUTPATIENT
Start: 2021-10-04

## 2021-10-05 ENCOUNTER — TELEPHONE (OUTPATIENT)
Dept: EMERGENCY DEPT | Facility: HOSPITAL | Age: 26
End: 2021-10-05

## 2021-11-27 ENCOUNTER — HOSPITAL ENCOUNTER (EMERGENCY)
Facility: HOSPITAL | Age: 26
Discharge: HOME | End: 2021-11-27
Attending: EMERGENCY MEDICINE | Admitting: EMERGENCY MEDICINE
Payer: COMMERCIAL

## 2021-11-27 VITALS
RESPIRATION RATE: 16 BRPM | WEIGHT: 275 LBS | HEART RATE: 101 BPM | DIASTOLIC BLOOD PRESSURE: 76 MMHG | SYSTOLIC BLOOD PRESSURE: 151 MMHG | OXYGEN SATURATION: 98 % | TEMPERATURE: 98.6 F

## 2021-11-27 DIAGNOSIS — G43.909 MIGRAINE: Primary | ICD-10-CM

## 2021-11-27 LAB
EXT PREG TEST URINE: NEGATIVE
EXT. CONTROL ED NAV: NORMAL

## 2021-11-27 PROCEDURE — 96375 TX/PRO/DX INJ NEW DRUG ADDON: CPT

## 2021-11-27 PROCEDURE — 99283 EMERGENCY DEPT VISIT LOW MDM: CPT

## 2021-11-27 PROCEDURE — 96374 THER/PROPH/DIAG INJ IV PUSH: CPT

## 2021-11-27 PROCEDURE — 81025 URINE PREGNANCY TEST: CPT | Performed by: PHYSICIAN ASSISTANT

## 2021-11-27 PROCEDURE — 99284 EMERGENCY DEPT VISIT MOD MDM: CPT | Performed by: PHYSICIAN ASSISTANT

## 2021-11-27 PROCEDURE — 96361 HYDRATE IV INFUSION ADD-ON: CPT

## 2021-11-27 RX ORDER — BUTALBITAL, ACETAMINOPHEN AND CAFFEINE 50; 325; 40 MG/1; MG/1; MG/1
1 TABLET ORAL EVERY 4 HOURS PRN
Qty: 14 TABLET | Refills: 0 | Status: SHIPPED | OUTPATIENT
Start: 2021-11-27

## 2021-11-27 RX ORDER — KETOROLAC TROMETHAMINE 30 MG/ML
15 INJECTION, SOLUTION INTRAMUSCULAR; INTRAVENOUS ONCE
Status: COMPLETED | OUTPATIENT
Start: 2021-11-27 | End: 2021-11-27

## 2021-11-27 RX ORDER — METOCLOPRAMIDE HYDROCHLORIDE 5 MG/ML
10 INJECTION INTRAMUSCULAR; INTRAVENOUS ONCE
Status: COMPLETED | OUTPATIENT
Start: 2021-11-27 | End: 2021-11-27

## 2021-11-27 RX ORDER — DIPHENHYDRAMINE HYDROCHLORIDE 50 MG/ML
50 INJECTION INTRAMUSCULAR; INTRAVENOUS ONCE
Status: COMPLETED | OUTPATIENT
Start: 2021-11-27 | End: 2021-11-27

## 2021-11-27 RX ADMIN — METOCLOPRAMIDE 10 MG: 5 INJECTION, SOLUTION INTRAMUSCULAR; INTRAVENOUS at 02:44

## 2021-11-27 RX ADMIN — KETOROLAC TROMETHAMINE 15 MG: 30 INJECTION, SOLUTION INTRAMUSCULAR; INTRAVENOUS at 02:45

## 2021-11-27 RX ADMIN — SODIUM CHLORIDE 1000 ML: 0.9 INJECTION, SOLUTION INTRAVENOUS at 02:43

## 2021-11-27 RX ADMIN — DIPHENHYDRAMINE HYDROCHLORIDE 50 MG: 50 INJECTION, SOLUTION INTRAMUSCULAR; INTRAVENOUS at 02:46

## 2022-07-16 ENCOUNTER — APPOINTMENT (EMERGENCY)
Dept: RADIOLOGY | Facility: HOSPITAL | Age: 27
End: 2022-07-16
Payer: MEDICARE

## 2022-07-16 ENCOUNTER — HOSPITAL ENCOUNTER (EMERGENCY)
Facility: HOSPITAL | Age: 27
Discharge: HOME | End: 2022-07-17
Attending: EMERGENCY MEDICINE
Payer: MEDICARE

## 2022-07-16 VITALS
DIASTOLIC BLOOD PRESSURE: 62 MMHG | HEART RATE: 73 BPM | WEIGHT: 292.55 LBS | HEIGHT: 62 IN | RESPIRATION RATE: 15 BRPM | TEMPERATURE: 97.6 F | OXYGEN SATURATION: 98 % | BODY MASS INDEX: 53.84 KG/M2 | SYSTOLIC BLOOD PRESSURE: 121 MMHG

## 2022-07-16 DIAGNOSIS — R07.89 CHEST WALL PAIN: Primary | ICD-10-CM

## 2022-07-16 LAB
ALBUMIN SERPL BCP-MCNC: 3.9 G/DL (ref 3–5.2)
ALP SERPL-CCNC: 75 U/L (ref 43–122)
ALT SERPL W P-5'-P-CCNC: 40 U/L
ANION GAP SERPL CALCULATED.3IONS-SCNC: 10 MMOL/L (ref 5–14)
AST SERPL W P-5'-P-CCNC: 29 U/L (ref 14–36)
ATRIAL RATE: 66 BPM
BASOPHILS # BLD AUTO: 0.02 THOUSANDS/ΜL (ref 0–0.1)
BASOPHILS NFR BLD AUTO: 0 % (ref 0–1)
BILIRUB SERPL-MCNC: 0.21 MG/DL
BUN SERPL-MCNC: 17 MG/DL (ref 5–25)
CALCIUM SERPL-MCNC: 8.6 MG/DL (ref 8.4–10.2)
CARDIAC TROPONIN I PNL SERPL HS: 3 NG/L
CHLORIDE SERPL-SCNC: 105 MMOL/L (ref 97–108)
CO2 SERPL-SCNC: 23 MMOL/L (ref 22–30)
CREAT SERPL-MCNC: 0.74 MG/DL (ref 0.6–1.2)
D DIMER PPP FEU-MCNC: 0.42 UG/ML FEU
EOSINOPHIL # BLD AUTO: 0.21 THOUSAND/ΜL (ref 0–0.61)
EOSINOPHIL NFR BLD AUTO: 2 % (ref 0–6)
ERYTHROCYTE [DISTWIDTH] IN BLOOD BY AUTOMATED COUNT: 14.3 % (ref 11.6–15.1)
EXT PREG TEST URINE: NEGATIVE
EXT. CONTROL ED NAV: NORMAL
GFR SERPL CREATININE-BSD FRML MDRD: 111 ML/MIN/1.73SQ M
GLUCOSE SERPL-MCNC: 225 MG/DL (ref 70–99)
HCT VFR BLD AUTO: 34.8 % (ref 34.8–46.1)
HGB BLD-MCNC: 10.5 G/DL (ref 11.5–15.4)
IMM GRANULOCYTES # BLD AUTO: 0.03 THOUSAND/UL (ref 0–0.2)
IMM GRANULOCYTES NFR BLD AUTO: 0 % (ref 0–2)
LYMPHOCYTES # BLD AUTO: 3.27 THOUSANDS/ΜL (ref 0.6–4.47)
LYMPHOCYTES NFR BLD AUTO: 29 % (ref 14–44)
MCH RBC QN AUTO: 25.8 PG (ref 26.8–34.3)
MCHC RBC AUTO-ENTMCNC: 30.2 G/DL (ref 31.4–37.4)
MCV RBC AUTO: 86 FL (ref 82–98)
MONOCYTES # BLD AUTO: 0.43 THOUSAND/ΜL (ref 0.17–1.22)
MONOCYTES NFR BLD AUTO: 4 % (ref 4–12)
NEUTROPHILS # BLD AUTO: 7.18 THOUSANDS/ΜL (ref 1.85–7.62)
NEUTS SEG NFR BLD AUTO: 65 % (ref 43–75)
NRBC BLD AUTO-RTO: 0 /100 WBCS
P AXIS: 22 DEGREES
PLATELET # BLD AUTO: 234 THOUSANDS/UL (ref 149–390)
PMV BLD AUTO: 11 FL (ref 8.9–12.7)
POTASSIUM SERPL-SCNC: 3.5 MMOL/L (ref 3.6–5)
PR INTERVAL: 142 MS
PROT SERPL-MCNC: 7.4 G/DL (ref 5.9–8.4)
QRS AXIS: 36 DEGREES
QRSD INTERVAL: 92 MS
QT INTERVAL: 430 MS
QTC INTERVAL: 450 MS
RBC # BLD AUTO: 4.07 MILLION/UL (ref 3.81–5.12)
SODIUM SERPL-SCNC: 138 MMOL/L (ref 137–147)
T WAVE AXIS: 17 DEGREES
VENTRICULAR RATE: 66 BPM
WBC # BLD AUTO: 11.14 THOUSAND/UL (ref 4.31–10.16)

## 2022-07-16 PROCEDURE — 85379 FIBRIN DEGRADATION QUANT: CPT | Performed by: EMERGENCY MEDICINE

## 2022-07-16 PROCEDURE — 81025 URINE PREGNANCY TEST: CPT | Performed by: EMERGENCY MEDICINE

## 2022-07-16 PROCEDURE — U0005 INFEC AGEN DETEC AMPLI PROBE: HCPCS | Performed by: EMERGENCY MEDICINE

## 2022-07-16 PROCEDURE — 99285 EMERGENCY DEPT VISIT HI MDM: CPT

## 2022-07-16 PROCEDURE — U0003 INFECTIOUS AGENT DETECTION BY NUCLEIC ACID (DNA OR RNA); SEVERE ACUTE RESPIRATORY SYNDROME CORONAVIRUS 2 (SARS-COV-2) (CORONAVIRUS DISEASE [COVID-19]), AMPLIFIED PROBE TECHNIQUE, MAKING USE OF HIGH THROUGHPUT TECHNOLOGIES AS DESCRIBED BY CMS-2020-01-R: HCPCS | Performed by: EMERGENCY MEDICINE

## 2022-07-16 PROCEDURE — 84484 ASSAY OF TROPONIN QUANT: CPT | Performed by: EMERGENCY MEDICINE

## 2022-07-16 PROCEDURE — 99285 EMERGENCY DEPT VISIT HI MDM: CPT | Performed by: EMERGENCY MEDICINE

## 2022-07-16 PROCEDURE — 96374 THER/PROPH/DIAG INJ IV PUSH: CPT

## 2022-07-16 PROCEDURE — 85025 COMPLETE CBC W/AUTO DIFF WBC: CPT | Performed by: EMERGENCY MEDICINE

## 2022-07-16 PROCEDURE — 93005 ELECTROCARDIOGRAM TRACING: CPT

## 2022-07-16 PROCEDURE — 36415 COLL VENOUS BLD VENIPUNCTURE: CPT | Performed by: EMERGENCY MEDICINE

## 2022-07-16 PROCEDURE — 80053 COMPREHEN METABOLIC PANEL: CPT | Performed by: EMERGENCY MEDICINE

## 2022-07-16 PROCEDURE — 93010 ELECTROCARDIOGRAM REPORT: CPT | Performed by: INTERNAL MEDICINE

## 2022-07-16 PROCEDURE — 71045 X-RAY EXAM CHEST 1 VIEW: CPT

## 2022-07-16 RX ORDER — KETOROLAC TROMETHAMINE 30 MG/ML
30 INJECTION, SOLUTION INTRAMUSCULAR; INTRAVENOUS ONCE
Status: COMPLETED | OUTPATIENT
Start: 2022-07-16 | End: 2022-07-16

## 2022-07-16 RX ADMIN — KETOROLAC TROMETHAMINE 30 MG: 30 INJECTION, SOLUTION INTRAMUSCULAR; INTRAVENOUS at 23:29

## 2022-07-17 LAB — SARS-COV-2 RNA RESP QL NAA+PROBE: NEGATIVE

## 2022-07-17 NOTE — ED PROVIDER NOTES
History  Chief Complaint   Patient presents with    Chest Pain     X2 days, states began while she was working, squeezing sensation  States vomited once yesterday and once today  Currently not SOB, was yesterday  Right arm pain and right wrist tingling  Patient is a 49-year-old female  She presents to the emergency room complaining of chest pain  Started 2 days ago  It is substernal   It is a squeezing-type pain  It is moderate in severity  It is worse when she leans forward  Patient does have some associated headache and right arm pain  Couple weeks ago she had a cough, none now  No fever  She has had some congestion and rhinorrhea  No hemoptysis, calf pain or unilateral leg swelling  She does report some shortness of breath  She did have some nausea and vomiting  No diaphoresis  It is not exertional   She denies any trauma  No history of cardiac disease or thromboembolic disease  Cardiac risk factors include obesity, diabetes, hypertension and family history  She denies hypercholesterolemia or smoking  Pain is moderate in severity  Prior to Admission Medications   Prescriptions Last Dose Informant Patient Reported? Taking?    NIFEdipine (PROCARDIA XL) 30 mg 24 hr tablet   Yes No   Sig: Take 30 mg by mouth daily   Prenatal Vit-Fe Fumarate-FA (PRENATAL COMPLETE) 14-0 4 MG TABS   No No   Sig: Take 1 tablet by mouth daily   benzonatate (TESSALON PERLES) 100 mg capsule   No No   Sig: Take 1 capsule (100 mg total) by mouth every 8 (eight) hours   butalbital-acetaminophen-caffeine (Esgic) -40 mg per tablet   No No   Sig: Take 1 tablet by mouth every 4 (four) hours as needed for headaches   famotidine (PEPCID) 20 mg tablet   No No   Sig: Take 1 tablet (20 mg total) by mouth 2 (two) times a day for 10 days   fexofenadine-pseudoephedrine (ALLEGRA-D)  MG per tablet   No No   Sig: Take 1 tablet by mouth every 12 (twelve) hours   predniSONE 20 mg tablet   No No   Sig: Take 3 tablets (60 mg total) by mouth daily   propranolol (INDERAL) 80 mg tablet   Yes No   Sig: Take 80 mg by mouth 2 (two) times a day      Facility-Administered Medications: None       Past Medical History:   Diagnosis Date    Abnormal CT of the head     Analgesic overuse headache     Asthma     Chronic pain of both shoulders     Hyperlipidemia     Hypertension     Obesity, morbid, BMI 50 or higher ([facility] [address] )     Urethral diverticulum        Past Surgical History:   Procedure Laterality Date     SECTION         History reviewed  No pertinent family history  I have reviewed and agree with the history as documented  E-Cigarette/Vaping     E-Cigarette/Vaping Substances     Social History     Tobacco Use    Smoking status: Former Smoker    Smokeless tobacco: Never Used   Substance Use Topics    Alcohol use: No    Drug use: Yes     Comment: Occa  Review of Systems   Constitutional: Negative for chills and fever  HENT: Positive for congestion  Negative for sore throat  Eyes: Negative for pain, redness and visual disturbance  Respiratory: Positive for shortness of breath  Negative for cough  Cardiovascular: Positive for chest pain  Negative for leg swelling  Gastrointestinal: Positive for nausea and vomiting  Negative for abdominal pain and diarrhea  Endocrine: Negative for polydipsia and polyuria  Genitourinary: Negative for dysuria, frequency, hematuria, vaginal bleeding and vaginal discharge  Musculoskeletal: Negative for back pain and neck pain  Skin: Negative for rash and wound  Allergic/Immunologic: Negative for immunocompromised state  Neurological: Positive for headaches  Negative for weakness and numbness  Hematological: Does not bruise/bleed easily  Psychiatric/Behavioral: Negative for hallucinations and suicidal ideas  All other systems reviewed and are negative  Physical Exam  Physical Exam  Vitals reviewed     Constitutional:       General: She is not in acute distress  Appearance: She is obese  HENT:      Head: Normocephalic and atraumatic  Nose: Nose normal       Mouth/Throat:      Mouth: Mucous membranes are moist    Eyes:      General:         Right eye: No discharge  Left eye: No discharge  Conjunctiva/sclera: Conjunctivae normal    Cardiovascular:      Rate and Rhythm: Normal rate and regular rhythm  Pulses: Normal pulses  Heart sounds: Normal heart sounds  No murmur heard  No friction rub  No gallop  Pulmonary:      Effort: Pulmonary effort is normal  No respiratory distress  Breath sounds: Normal breath sounds  No stridor  No decreased breath sounds, wheezing, rhonchi or rales  Chest:      Chest wall: Tenderness present  Abdominal:      General: Bowel sounds are normal  There is no distension  Palpations: Abdomen is soft  Tenderness: There is no abdominal tenderness  There is no right CVA tenderness, left CVA tenderness, guarding or rebound  Musculoskeletal:         General: No swelling, tenderness, deformity or signs of injury  Normal range of motion  Cervical back: Normal range of motion and neck supple  No rigidity  Right lower leg: No edema  Left lower leg: No edema  Comments: No calf tenderness or unilateral leg swelling  Skin:     General: Skin is warm and dry  Coloration: Skin is not jaundiced  Findings: No rash  Neurological:      General: No focal deficit present  Mental Status: She is alert and oriented to person, place, and time  Sensory: No sensory deficit  Motor: Motor function is intact     Psychiatric:         Mood and Affect: Mood normal          Behavior: Behavior normal          Vital Signs  ED Triage Vitals [07/16/22 2120]   Temperature Pulse Respirations Blood Pressure SpO2   97 6 °F (36 4 °C) 73 20 157/78 98 %      Temp Source Heart Rate Source Patient Position - Orthostatic VS BP Location FiO2 (%)   Oral Monitor Lying Left arm -- Pain Score       8           Vitals:    07/16/22 2120 07/16/22 2231   BP: 157/78 121/62   Pulse: 73 73   Patient Position - Orthostatic VS: Lying Lying         Visual Acuity      ED Medications  Medications   ketorolac (TORADOL) injection 30 mg (30 mg Intravenous Given 7/16/22 2329)       Diagnostic Studies  Results Reviewed     Procedure Component Value Units Date/Time    HS Troponin 0hr (reflex protocol) [[id number]]  (Normal) Collected: 07/16/22 2253    Lab Status: Final result Specimen: Blood from Arm, Right Updated: 07/16/22 2321     hs TnI 0hr 3 ng/L     Comprehensive metabolic panel [[id number]]  (Abnormal) Collected: 07/16/22 2253    Lab Status: Final result Specimen: Blood from Arm, Right Updated: 07/16/22 2320     Sodium 138 mmol/L      Potassium 3 5 mmol/L      Chloride 105 mmol/L      CO2 23 mmol/L      ANION GAP 10 mmol/L      BUN 17 mg/dL      Creatinine 0 74 mg/dL      Glucose 225 mg/dL      Calcium 8 6 mg/dL      AST 29 U/L      ALT 40 U/L      Alkaline Phosphatase 75 U/L      Total Protein 7 4 g/dL      Albumin 3 9 g/dL      Total Bilirubin 0 21 mg/dL      eGFR 111 ml/min/1 73sq m     Narrative:      [place] guidelines for Chronic Kidney Disease (CKD):     Stage 1 with normal or high GFR (GFR > 90 mL/min/1 73 square meters)    Stage 2 Mild CKD (GFR = 60-89 mL/min/1 73 square meters)    Stage 3A Moderate CKD (GFR = 45-59 mL/min/1 73 square meters)    Stage 3B Moderate CKD (GFR = 30-44 mL/min/1 73 square meters)    Stage 4 Severe CKD (GFR = 15-29 mL/min/1 73 square meters)    Stage 5 End Stage CKD (GFR <15 mL/min/1 73 square meters)  Note: GFR calculation is accurate only with a steady state creatinine    COVID only [[id number]] Collected: 07/16/22 2253    Lab Status:  In process Specimen: Nares from Nose Updated: 07/16/22 2313    D-Dimer [[id number]]  (Normal) Collected: 07/16/22 2253    Lab Status: Final result Specimen: Blood from Arm, Right Updated: 07/16/22 7563 D-Dimer, Quant 0 42 ug/ml FEU     CBC and differential [[id number]]  (Abnormal) Collected: 07/16/22 2253    Lab Status: Final result Specimen: Blood from Arm, Right Updated: 07/16/22 2302     WBC 11 14 Thousand/uL      RBC 4 07 Million/uL      Hemoglobin 10 5 g/dL      Hematocrit 34 8 %      MCV 86 fL      MCH 25 8 pg      MCHC 30 2 g/dL      RDW 14 3 %      MPV 11 0 fL      Platelets 062 Thousands/uL      nRBC 0 /100 WBCs      Neutrophils Relative 65 %      Immat GRANS % 0 %      Lymphocytes Relative 29 %      Monocytes Relative 4 %      Eosinophils Relative 2 %      Basophils Relative 0 %      Neutrophils Absolute 7 18 Thousands/µL      Immature Grans Absolute 0 03 Thousand/uL      Lymphocytes Absolute 3 27 Thousands/µL      Monocytes Absolute 0 43 Thousand/µL      Eosinophils Absolute 0 21 Thousand/µL      Basophils Absolute 0 02 Thousands/µL     POCT pregnancy, urine [[id number]]  (Normal) Resulted: 07/16/22 2227    Lab Status: Final result Updated: 07/16/22 2228     EXT PREG TEST UR (Ref: Negative) negative     Control valid                 XR chest 1 view portable   ED Interpretation by Kassandra Sam MD (07/16 2327)   No infiltrates  No pneumothorax  No widened mediastinum  No acute disease                   Procedures  ECG 12 Lead Documentation Only    Date/Time: 7/16/2022 9:31 PM  Performed by: Kassandra Sam MD  Authorized by: Kassandra Sam MD     ECG reviewed by me, the ED Provider: yes    Patient location:  ED  Interpretation:     Interpretation: normal    Rate:     ECG rate assessment: normal    Rhythm:     Rhythm: sinus rhythm    Ectopy:     Ectopy: none    QRS:     QRS axis:  Normal  Conduction:     Conduction: normal    ST segments:     ST segments:  Normal  T waves:     T waves: normal               ED Course             HEART Risk Score    Flowsheet Row Most Recent Value   Heart Score Risk Calculator    History 0 Filed at: 07/16/2022 5418   ECG 0 Filed at: 07/16/2022 8556   Age 0 Filed at: 07/16/2022 2355   Risk Factors 2 Filed at: 07/16/2022 2355   Troponin 0 Filed at: 07/16/2022 2355   HEART Score 2 Filed at: 07/16/2022 2355                                      [facility]  Number of Diagnoses or Management Options  Diagnosis management comments: Chest x-ray was negative for pneumonia or pneumothorax  This pain would be atypical for dissection  EKG was normal   Heart score was low  Troponin was normal   Doubt acute coronary syndrome  D-dimer was negative making pulmonary embolism unlikely  This is most likely chest wall pain  A COVID test is pending  Patient is currently appropriate for discharge and outpatient management  Amount and/or Complexity of Data Reviewed  Clinical lab tests: ordered and reviewed  Tests in the radiology section of CPT®: ordered and reviewed  Independent visualization of images, tracings, or specimens: yes        Disposition  Final diagnoses:   Chest wall pain     Time reflects when diagnosis was documented in both MDM as applicable and the Disposition within this note     Time User Action Codes Description Comment    7/16/2022 11:56 PM Maria Alejandra Vences [R07 89] Chest wall pain       ED Disposition     ED Disposition   Discharge    Condition   Stable    Date/Time   Sat Jul 16, 2022 11:56 PM    Comment   Sha Muñoz discharge to home/self care  Follow-up Information     Follow up With Specialties Details Why [address] [facility] In 2 days  [address]  [phone]            Patient's Medications   Discharge Prescriptions    No medications on file       No discharge procedures on file      PDMP Review     None          ED Provider  Electronically Signed by           Maye El MD  07/16/22 [facility] Olivier Byers MD  07/16/22 5445

## 2022-09-17 ENCOUNTER — HOSPITAL ENCOUNTER (EMERGENCY)
Facility: HOSPITAL | Age: 27
Discharge: HOME | End: 2022-09-17
Attending: EMERGENCY MEDICINE
Payer: MEDICARE

## 2022-09-17 VITALS
HEART RATE: 77 BPM | BODY MASS INDEX: 50.68 KG/M2 | SYSTOLIC BLOOD PRESSURE: 154 MMHG | DIASTOLIC BLOOD PRESSURE: 92 MMHG | HEIGHT: 63 IN | RESPIRATION RATE: 16 BRPM | WEIGHT: 286 LBS | TEMPERATURE: 97.9 F | OXYGEN SATURATION: 98 %

## 2022-09-17 DIAGNOSIS — J32.9 SINUSITIS: Primary | ICD-10-CM

## 2022-09-17 DIAGNOSIS — J40 BRONCHITIS: ICD-10-CM

## 2022-09-17 LAB
S PYO DNA THROAT QL NAA+PROBE: NOT DETECTED
SARS-COV-2 RNA RESP QL NAA+PROBE: NEGATIVE

## 2022-09-17 PROCEDURE — 99284 EMERGENCY DEPT VISIT MOD MDM: CPT | Performed by: PHYSICIAN ASSISTANT

## 2022-09-17 PROCEDURE — 87651 STREP A DNA AMP PROBE: CPT | Performed by: PHYSICIAN ASSISTANT

## 2022-09-17 PROCEDURE — U0005 INFEC AGEN DETEC AMPLI PROBE: HCPCS | Performed by: PHYSICIAN ASSISTANT

## 2022-09-17 PROCEDURE — 99282 EMERGENCY DEPT VISIT SF MDM: CPT

## 2022-09-17 PROCEDURE — U0003 INFECTIOUS AGENT DETECTION BY NUCLEIC ACID (DNA OR RNA); SEVERE ACUTE RESPIRATORY SYNDROME CORONAVIRUS 2 (SARS-COV-2) (CORONAVIRUS DISEASE [COVID-19]), AMPLIFIED PROBE TECHNIQUE, MAKING USE OF HIGH THROUGHPUT TECHNOLOGIES AS DESCRIBED BY CMS-2020-01-R: HCPCS | Performed by: PHYSICIAN ASSISTANT

## 2022-09-17 RX ORDER — ACETAZOLAMIDE 250 MG/1
TABLET ORAL
COMMUNITY
Start: 2022-09-08

## 2022-09-17 RX ORDER — RIZATRIPTAN BENZOATE 10 MG/1
10 TABLET ORAL
COMMUNITY
Start: 2022-06-02 | End: 2023-06-02

## 2022-09-17 RX ORDER — BENZONATATE 100 MG/1
100 CAPSULE ORAL 3 TIMES DAILY PRN
Qty: 20 CAPSULE | Refills: 0 | Status: SHIPPED | OUTPATIENT
Start: 2022-09-17

## 2022-09-17 RX ORDER — IBUPROFEN 800 MG/1
800 TABLET ORAL 2 TIMES DAILY PRN
COMMUNITY
Start: 2022-06-02 | End: 2023-06-02

## 2022-09-17 RX ORDER — LOSARTAN POTASSIUM 25 MG/1
25 TABLET ORAL DAILY
COMMUNITY
Start: 2022-06-01 | End: 2023-06-01

## 2022-09-17 RX ORDER — ALBUTEROL SULFATE 2.5 MG/3ML
2.5 SOLUTION RESPIRATORY (INHALATION) EVERY 6 HOURS PRN
Qty: 120 ML | Refills: 0 | Status: SHIPPED | OUTPATIENT
Start: 2022-09-17

## 2022-09-17 RX ORDER — AZITHROMYCIN 250 MG/1
TABLET, FILM COATED ORAL
Qty: 6 TABLET | Refills: 0 | Status: SHIPPED | OUTPATIENT
Start: 2022-09-17 | End: 2022-09-21

## 2022-09-17 RX ORDER — ALBUTEROL SULFATE 90 UG/1
2 AEROSOL, METERED RESPIRATORY (INHALATION) EVERY 6 HOURS PRN
Qty: 8.5 G | Refills: 0 | Status: SHIPPED | OUTPATIENT
Start: 2022-09-17

## 2022-09-17 RX ORDER — PANTOPRAZOLE SODIUM 40 MG/1
40 TABLET, DELAYED RELEASE ORAL DAILY
COMMUNITY
Start: 2022-06-01 | End: 2023-06-01

## 2022-09-17 RX ORDER — PROCHLORPERAZINE MALEATE 5 MG/1
5 TABLET ORAL EVERY 6 HOURS PRN
COMMUNITY
Start: 2022-09-14 | End: 2022-09-21

## 2022-09-17 RX ORDER — DICYCLOMINE HCL 20 MG
20 TABLET ORAL EVERY 6 HOURS
COMMUNITY
Start: 2022-09-14 | End: 2022-09-19

## 2022-09-17 RX ORDER — FLUTICASONE PROPIONATE 44 UG/1
2 AEROSOL, METERED RESPIRATORY (INHALATION) 2 TIMES DAILY
COMMUNITY
Start: 2022-06-01 | End: 2023-06-01

## 2022-09-17 RX ORDER — ONDANSETRON HYDROCHLORIDE 8 MG/1
8 TABLET, FILM COATED ORAL EVERY 12 HOURS PRN
COMMUNITY
Start: 2022-06-02

## 2022-09-17 RX ORDER — BUSPIRONE HYDROCHLORIDE 10 MG/1
TABLET ORAL
COMMUNITY
Start: 2022-08-18

## 2022-09-17 RX ORDER — DULAGLUTIDE 1.5 MG/.5ML
1.5 INJECTION, SOLUTION SUBCUTANEOUS WEEKLY
COMMUNITY
Start: 2022-09-18

## 2022-09-17 NOTE — ED PROVIDER NOTES
History  Chief Complaint   Patient presents with    Letter for School/Work     Patient reports job wont accept positive home test for Covid  Requesting a swab and a note  Nasal congestion, cough, sore throat, loss of taste  Pt with 4 days productive cough yellow congestion sore throat and + home covid test       Cough  Cough characteristics:  Productive  Sputum characteristics:  Nondescript  Severity:  Moderate  Onset quality:  Gradual  Duration:  4 days  Timing:  Constant  Progression:  Unchanged  Chronicity:  New  Smoker: no    Context: not animal exposure    Relieved by:  Nothing  Worsened by:  Nothing  Ineffective treatments:  None tried  Associated symptoms: sore throat    Risk factors: no chemical exposure, no recent infection and no recent travel        Prior to Admission Medications   Prescriptions Last Dose Informant Patient Reported? Taking?    Dulaglutide (Trulicity) 1 5 IY/4 6OS SOPN   Yes No   Sig: Inject 1 5 mg under the skin Once a week   NIFEdipine (PROCARDIA XL) 30 mg 24 hr tablet   Yes No   Sig: Take 30 mg by mouth daily   acetaZOLAMIDE (DIAMOX) 250 mg tablet   Yes Yes   Si tablet at night for 1 week then 1 tablet twice daily for 1 week then 1 tablet in the morning 2 tablets at night for 1 week then 2 tablets twice daily and continue   busPIRone (BUSPAR) 10 mg tablet   Yes Yes   butalbital-acetaminophen-caffeine (Esgic) -40 mg per tablet   No No   Sig: Take 1 tablet by mouth every 4 (four) hours as needed for headaches   dicyclomine (BENTYL) 20 mg tablet   Yes Yes   Sig: Take 20 mg by mouth every 6 (six) hours   diphenhydrAMINE (SOMINEX) 25 MG tablet   Yes Yes   Sig: Take 25 mg by mouth every 6 (six) hours as needed   fluticasone (Flovent HFA) 44 mcg/act inhaler   Yes Yes   Sig: Inhale 2 puffs 2 (two) times a day   ibuprofen (MOTRIN) 800 mg tablet   Yes Yes   Sig: Take 800 mg by mouth 2 (two) times a day as needed   losartan (COZAAR) 25 mg tablet   Yes Yes   Sig: Take 25 mg by mouth daily   ondansetron (ZOFRAN) 8 mg tablet   Yes Yes   Sig: Take 8 mg by mouth every 12 (twelve) hours as needed   pantoprazole (PROTONIX) 40 mg tablet   Yes Yes   Sig: Take 40 mg by mouth daily   prochlorperazine (COMPAZINE) 5 mg tablet   Yes Yes   Sig: Take 5 mg by mouth every 6 (six) hours as needed   propranolol (INDERAL) 80 mg tablet   Yes No   Sig: Take 80 mg by mouth 2 (two) times a day   rizatriptan (MAXALT) 10 mg tablet   Yes Yes   Sig: Take 10 mg by mouth      Facility-Administered Medications: None       Past Medical History:   Diagnosis Date    Abnormal CT of the head     Analgesic overuse headache     Asthma     Chronic pain of both shoulders     Hyperlipidemia     Hypertension     Obesity, morbid, BMI 50 or higher ([address] )     Urethral diverticulum        Past Surgical History:   Procedure Laterality Date     SECTION         History reviewed  No pertinent family history  I have reviewed and agree with the history as documented  E-Cigarette/Vaping    E-Cigarette Use Former User      E-Cigarette/Vaping Substances     Social History     Tobacco Use    Smoking status: Former Smoker    Smokeless tobacco: Never Used   Vaping Use    Vaping Use: Former   Substance Use Topics    Alcohol use: Yes    Drug use: Not Currently     Types: Marijuana       Review of Systems   Constitutional: Positive for fatigue  HENT: Positive for congestion and sore throat  Eyes: Negative  Respiratory: Positive for cough  Cardiovascular: Negative  Gastrointestinal: Negative  Endocrine: Negative  Genitourinary: Negative  Musculoskeletal: Negative  Skin: Negative  Allergic/Immunologic: Negative  Neurological: Negative  Hematological: Negative  Psychiatric/Behavioral: Negative  All other systems reviewed and are negative  Physical Exam  Physical Exam  Vitals and nursing note reviewed  Constitutional:       Appearance: Normal appearance  She is normal weight     HENT: Head: Normocephalic and atraumatic  Right Ear: Tympanic membrane, ear canal and external ear normal       Left Ear: Tympanic membrane, ear canal and external ear normal       Nose: Congestion and rhinorrhea present  Comments: Clear      Mouth/Throat:      Mouth: Mucous membranes are moist       Pharynx: Oropharynx is clear  Eyes:      Extraocular Movements: Extraocular movements intact  Conjunctiva/sclera: Conjunctivae normal       Pupils: Pupils are equal, round, and reactive to light  Cardiovascular:      Rate and Rhythm: Normal rate and regular rhythm  Pulses: Normal pulses  Heart sounds: Normal heart sounds  Pulmonary:      Effort: Pulmonary effort is normal       Comments: Minor coarse sounds  No wheeze   Abdominal:      General: Abdomen is flat  Bowel sounds are normal       Palpations: Abdomen is soft  Musculoskeletal:         General: Normal range of motion  Cervical back: Normal range of motion and neck supple  Skin:     General: Skin is warm  Capillary Refill: Capillary refill takes less than 2 seconds  Neurological:      General: No focal deficit present  Mental Status: She is alert and oriented to person, place, and time     Psychiatric:         Mood and Affect: Mood normal          Behavior: Behavior normal          Vital Signs  ED Triage Vitals [09/17/22 1405]   Temperature Pulse Respirations Blood Pressure SpO2   97 9 °F (36 6 °C) 77 16 154/92 98 %      Temp Source Heart Rate Source Patient Position - Orthostatic VS BP Location FiO2 (%)   Oral Monitor Sitting Left arm --      Pain Score       --           Vitals:    09/17/22 1405   BP: 154/92   Pulse: 77   Patient Position - Orthostatic VS: Sitting         Visual Acuity      ED Medications  Medications - No data to display    Diagnostic Studies  Results Reviewed     Procedure Component Value Units Date/Time    COVID only [[id number]]  (Normal) Collected: 09/17/22 1430    Lab Status: Final result Specimen: Nares from Nose Updated: 09/17/22 1538     SARS-CoV-2 Negative    Narrative:      FOR PEDIATRIC PATIENTS - copy/paste COVID Guidelines URL to browser: [email or web address]  [employer]    SARS-CoV-2 assay is a Nucleic Acid Amplification assay intended for the  qualitative detection of nucleic acid from SARS-CoV-2 in nasopharyngeal  swabs  Results are for the presumptive identification of SARS-CoV-2 RNA  Positive results are indicative of infection with SARS-CoV-2, the virus  causing COVID-19, but do not rule out bacterial infection or co-infection  with other viruses  Laboratories within the [place] and its  territories are required to report all positive results to the appropriate  public health authorities  Negative results do not preclude SARS-CoV-2  infection and should not be used as the sole basis for treatment or other  patient management decisions  Negative results must be combined with  clinical observations, patient history, and epidemiological information  This test has not been FDA cleared or approved  This test has been authorized by FDA under an Emergency Use Authorization  (EUA)  This test is only authorized for the duration of time the  declaration that circumstances exist justifying the authorization of the  emergency use of an in vitro diagnostic tests for detection of SARS-CoV-2  virus and/or diagnosis of COVID-19 infection under section 564(b)(1) of  the Act, 21 U  S C  701FBJ-5(H)(2), unless the authorization is terminated  or revoked sooner  The test has been validated but independent review by FDA  and CLIA is pending  Test performed using [employer] Gene[employer]pert: This RT-PCR assay targets N2,  a region unique to SARS-CoV-2  A conserved region in the E-gene was chosen  for pan-Sarbecovirus detection which includes SARS-CoV-2      Strep A PCR [[id number]]  (Normal) Collected: 09/17/22 1430    Lab Status: Final result Specimen: Throat Updated: 09/17/22 1515     STREP A PCR Not Detected                 No orders to display              Procedures  Procedures         ED Course                                             MDM    Disposition  Final diagnoses:   Sinusitis   Bronchitis     Time reflects when diagnosis was documented in both MDM as applicable and the Disposition within this note     Time User Action Codes Description Comment    9/17/2022  3:53 PM Reyes Freeze  Add [J32 9] Sinusitis     9/17/2022  3:53 PM Reyes Freeze  Add [J40] Bronchitis       ED Disposition     ED Disposition   Discharge    Condition   Stable    Date/Time   Sat Sep 17, 2022  3:53 PM    Comment   Sha Muñoz discharge to home/self care                 Follow-up Information     Follow up With Specialties Details Why [address], [place] [address]  [phone]            Discharge Medication List as of 9/17/2022  3:55 PM      START taking these medications    Details   albuterol (2 5 mg/3 mL) 0 083 % nebulizer solution Take 3 mL (2 5 mg total) by nebulization every 6 (six) hours as needed for wheezing or shortness of breath, Starting Sat 9/17/2022, Print      albuterol (ProAir HFA) 90 mcg/act inhaler Inhale 2 puffs every 6 (six) hours as needed for wheezing, Starting Sat 9/17/2022, Print      azithromycin (ZITHROMAX) 250 mg tablet Take 2 tablets today then 1 tablet daily x 4 days, Print      benzonatate (TESSALON PERLES) 100 mg capsule Take 1 capsule (100 mg total) by mouth 3 (three) times a day as needed for cough, Starting Sat 9/17/2022, Print         CONTINUE these medications which have NOT CHANGED    Details   acetaZOLAMIDE (DIAMOX) 250 mg tablet 1 tablet at night for 1 week then 1 tablet twice daily for 1 week then 1 tablet in the morning 2 tablets at night for 1 week then 2 tablets twice daily and continue, Historical Med      busPIRone (BUSPAR) 10 mg tablet Starting Thu 8/18/2022, Historical Med      dicyclomine (BENTYL) 20 mg tablet Take 20 mg by mouth every 6 (six) hours, Starting Wed 9/14/2022, Until Mon 9/19/2022, Historical Med      diphenhydrAMINE (SOMINEX) 25 MG tablet Take 25 mg by mouth every 6 (six) hours as needed, Starting Wed 9/14/2022, Until Fri 10/14/2022 at 2359, Historical Med      fluticasone (Flovent HFA) 44 mcg/act inhaler Inhale 2 puffs 2 (two) times a day, Starting Wed 6/1/2022, Until Thu 6/1/2023, Historical Med      ibuprofen (MOTRIN) 800 mg tablet Take 800 mg by mouth 2 (two) times a day as needed, Starting Thu 6/2/2022, Until Fri 6/2/2023 at 2359, Historical Med      losartan (COZAAR) 25 mg tablet Take 25 mg by mouth daily, Starting Wed 6/1/2022, Until Thu 6/1/2023, Historical Med      ondansetron (ZOFRAN) 8 mg tablet Take 8 mg by mouth every 12 (twelve) hours as needed, Starting Thu 6/2/2022, Historical Med      pantoprazole (PROTONIX) 40 mg tablet Take 40 mg by mouth daily, Starting Wed 6/1/2022, Until Thu 6/1/2023, Historical Med      prochlorperazine (COMPAZINE) 5 mg tablet Take 5 mg by mouth every 6 (six) hours as needed, Starting Wed 9/14/2022, Until Wed 9/21/2022 at 2359, Historical Med      rizatriptan (MAXALT) 10 mg tablet Take 10 mg by mouth, Starting Thu 6/2/2022, Until Fri 6/2/2023 at 2359, Historical Med      butalbital-acetaminophen-caffeine (Esgic) -40 mg per tablet Take 1 tablet by mouth every 4 (four) hours as needed for headaches, Starting Sat 11/27/2021, Normal      Dulaglutide (Trulicity) 1 5 EA/0 2TH SOPN Inject 1 5 mg under the skin Once a week, Starting Sun 9/18/2022, Historical Med      NIFEdipine (PROCARDIA XL) 30 mg 24 hr tablet Take 30 mg by mouth daily, Starting Mon 4/13/2020, Until Tue 4/13/2021, Historical Med      propranolol (INDERAL) 80 mg tablet Take 80 mg by mouth 2 (two) times a day, Starting Mon 1/27/2020, Until Tue 1/26/2021, Historical Med             No discharge procedures on file      PDMP Review     None          ED Provider  Electronically Signed by           Luan Dickerson PA-C  09/17/22 1640

## 2022-09-17 NOTE — Clinical Note
Deni Mark was seen and treated in our emergency department on 9/17/2022  Diagnosis:     Cinthya Bullock  may return to work on return date  She may return on this date: 09/19/2022         If you have any questions or concerns, please don't hesitate to call        Navi Dotson PA-C    ______________________________           _______________          _______________  Hospital Representative                              Date                                Time

## 2022-11-21 ENCOUNTER — HOSPITAL ENCOUNTER (EMERGENCY)
Facility: HOSPITAL | Age: 27
Discharge: HOME | End: 2022-11-21
Attending: EMERGENCY MEDICINE

## 2022-11-21 VITALS
TEMPERATURE: 98.6 F | WEIGHT: 290 LBS | HEART RATE: 76 BPM | OXYGEN SATURATION: 100 % | DIASTOLIC BLOOD PRESSURE: 99 MMHG | SYSTOLIC BLOOD PRESSURE: 175 MMHG | RESPIRATION RATE: 14 BRPM | HEIGHT: 62 IN | BODY MASS INDEX: 53.37 KG/M2

## 2022-11-21 DIAGNOSIS — J06.9 URI WITH COUGH AND CONGESTION: Primary | ICD-10-CM

## 2022-11-21 RX ORDER — GUAIFENESIN 600 MG/1
1200 TABLET, EXTENDED RELEASE ORAL EVERY 12 HOURS SCHEDULED
Qty: 20 TABLET | Refills: 0 | Status: SHIPPED | OUTPATIENT
Start: 2022-11-21

## 2022-11-21 RX ORDER — IBUPROFEN 600 MG/1
600 TABLET ORAL EVERY 6 HOURS PRN
Qty: 30 TABLET | Refills: 0 | Status: SHIPPED | OUTPATIENT
Start: 2022-11-21

## 2022-11-21 RX ORDER — CEFTRIAXONE 2 G/50ML
2000 INJECTION, SOLUTION INTRAVENOUS ONCE
Status: DISCONTINUED | OUTPATIENT
Start: 2022-11-21 | End: 2022-11-21

## 2022-11-21 RX ORDER — FLUTICASONE PROPIONATE 50 MCG
1 SPRAY, SUSPENSION (ML) NASAL DAILY
Qty: 16 G | Refills: 0 | Status: SHIPPED | OUTPATIENT
Start: 2022-11-21

## 2022-11-21 RX ORDER — ACETAMINOPHEN 500 MG
500 TABLET ORAL EVERY 6 HOURS PRN
Qty: 30 TABLET | Refills: 0 | Status: SHIPPED | OUTPATIENT
Start: 2022-11-21

## 2022-11-22 LAB
FLUAV RNA RESP QL NAA+PROBE: NEGATIVE
FLUBV RNA RESP QL NAA+PROBE: NEGATIVE
SARS-COV-2 RNA RESP QL NAA+PROBE: NEGATIVE

## 2022-11-22 NOTE — DISCHARGE INSTRUCTIONS
We will call you with the results of your COVID-19/Influenza test  They will also be available on your MyChart  Please be patient at this time as we have a very large volume of tests and it may take several days to come back  Stay home until the results are received, then follow the appropriate CDC quarantine/isolation guidelines based on your vaccinations status and symptoms  Follow up with your Primary Care Provider  Return to ED for new or worsening symptoms as discussed

## 2022-11-22 NOTE — ED PROVIDER NOTES
HPI: Patient is a 32 y o  female with past medical history of asthma, hyperlipidemia, hypertension who presents with 1-2 days of cough, sore throat and sinus congestion, sinus pressure, R ear fullness  which the patient describes at moderate  Sinus pressure is worse on the right  The patient has had contact with people with similar symptoms  The patient has taken acetaminophen without relief of symptoms  She is vaccinated against COVID-19 x2  She is not vaccinated for influenza  She denies fever, chills, chest pain, shortness of breath, chest tightness, wheezing, hemoptysis, hearing loss, ear discharge, mastoid tenderness or erythema, tinnitus, eye pain or redness, vision change, difficulty swelling, voice change, neck pain or stiffness, rash, nausea, vomiting, diarrhea, abdominal pain, weakness, dizziness, syncope    Allergies   Allergen Reactions   • Piper Shortness Of Breath     Black pepper   • Metformin GI Intolerance   • Cheese - Food Allergy Rash and Swelling     Swelling lips        Past Medical History:   Diagnosis Date   • Abnormal CT of the head    • Analgesic overuse headache    • Asthma    • Chronic pain of both shoulders    • Hyperlipidemia    • Hypertension    • Obesity, morbid, BMI 50 or higher (HCC)    • Urethral diverticulum       Past Surgical History:   Procedure Laterality Date   •  SECTION       Social History     Tobacco Use   • Smoking status: Former   • Smokeless tobacco: Never   Vaping Use   • Vaping Use: Former   Substance Use Topics   • Alcohol use:  Yes   • Drug use: Not Currently     Types: Marijuana       Nursing notes reviewed  Physical Exam:  ED Triage Vitals [22]   Temperature Pulse Respirations Blood Pressure SpO2   98 6 °F (37 °C) 76 14 (!) 175/99 100 %      Temp Source Heart Rate Source Patient Position - Orthostatic VS BP Location FiO2 (%)   Oral Monitor Sitting Left arm --      Pain Score       7           ROS: Positive for cough, sore throat, congestion, ear pain, sinus pressure, the remainder of a 10 organ system ROS was otherwise unremarkable  General: awake, alert, no acute distress  Head: normocephalic, atraumatic  Eyes: no scleral icterus, no discharge   Ears: external ears normal, hearing grossly intact, normal canal b/l, b/l TM intact without erythema or bulging   Nose: external exam grossly normal, negative nasal discharge, congestion   Mouth:  Erythematous oropharynx without swelling or exudate  Uvula midline  No tonsillar swelling, exudates or abscesses  Neck: symmetric, No JVD noted, trachea midline, no anterior cervical lymphadenopathy and a full range of motion of neck without pain  Pulmonary: Patient in no respiratory distress, speaking in full sentences, managing oral secretions without difficulty, no accessory muscle use, retractions, or belly breathing noted, no adventitious lung sounds auscultated bilaterally  Cardiovascular: appears well perfused, RRR, no murmurs   Abdomen: no distention noted, no tenderness   Musculoskeletal: no deformities noted, tone normal  Neuro: grossly non-focal  Psych: mood and affect appropriate  Skin: warm, dry, no rash    The patient is stable and has a history and physical exam consistent with a viral illness  COVID19/Influenza testing has been performed  I considered the patient's other medical conditions as applicable/noted above in my medical decision making  The patient is stable upon discharge  The plan is for supportive care at home  The patient (and any family present) verbalized understanding of the discharge instructions and warnings that would necessitate return to the Emergency Department  All questions were answered prior to discharge      Medications - No data to display  Final diagnoses:   URI with cough and congestion     Time reflects when diagnosis was documented in both MDM as applicable and the Disposition within this note     Time User Action Codes Description Comment 11/21/2022  9:28 PM Ruben Crump Add [J06 9] URI with cough and congestion       ED Disposition     ED Disposition   Discharge    Condition   Stable    Date/Time   Mon Nov 21, 2022  9:35 PM    Comment   Sha Muñoz discharge to home/self care  Follow-up Information     Follow up With Specialties Details Why [facility] Schedule an appointment as soon as possible for a visit  For follow up regarding your symptoms Arely Morgan [address]  [address]  [phone]          Discharge Medication List as of 11/21/2022  9:35 PM      START taking these medications    Details   acetaminophen (TYLENOL) 500 mg tablet Take 1 tablet (500 mg total) by mouth every 6 (six) hours as needed for mild pain or moderate pain, Starting Mon 11/21/2022, Normal      fluticasone (FLONASE) 50 mcg/act nasal spray 1 spray into each nostril daily, Starting Mon 11/21/2022, Normal      guaiFENesin (MUCINEX) 600 mg 12 hr tablet Take 2 tablets (1,200 mg total) by mouth every 12 (twelve) hours, Starting Mon 11/21/2022, Normal      !! ibuprofen (MOTRIN) 600 mg tablet Take 1 tablet (600 mg total) by mouth every 6 (six) hours as needed for mild pain or moderate pain, Starting Mon 11/21/2022, Normal      menthol-cetylpyridinium (CEPACOL) 3 MG lozenge Take 1 lozenge (3 mg total) by mouth as needed for sore throat, Starting Mon 11/21/2022, Normal       !! - Potential duplicate medications found  Please discuss with provider        CONTINUE these medications which have NOT CHANGED    Details   acetaZOLAMIDE (DIAMOX) 250 mg tablet 1 tablet at night for 1 week then 1 tablet twice daily for 1 week then 1 tablet in the morning 2 tablets at night for 1 week then 2 tablets twice daily and continue, Historical Med      albuterol (2 5 mg/3 mL) 0 083 % nebulizer solution Take 3 mL (2 5 mg total) by nebulization every 6 (six) hours as needed for wheezing or shortness of breath, Starting Sat 9/17/2022, Print      albuterol (ProAir HFA) 90 mcg/act inhaler Inhale 2 puffs every 6 (six) hours as needed for wheezing, Starting Sat 9/17/2022, Print      benzonatate (TESSALON PERLES) 100 mg capsule Take 1 capsule (100 mg total) by mouth 3 (three) times a day as needed for cough, Starting Sat 9/17/2022, Print      busPIRone (BUSPAR) 10 mg tablet Starting Thu 8/18/2022, Historical Med      butalbital-acetaminophen-caffeine (Esgic) -40 mg per tablet Take 1 tablet by mouth every 4 (four) hours as needed for headaches, Starting Sat 11/27/2021, Normal      diphenhydrAMINE (SOMINEX) 25 MG tablet Take 25 mg by mouth every 6 (six) hours as needed, Starting Wed 9/14/2022, Until Fri 10/14/2022 at 2359, Historical Med      Dulaglutide (Trulicity) 1 5 OI/0 2CF SOPN Inject 1 5 mg under the skin Once a week, Starting Sun 9/18/2022, Historical Med      fluticasone (Flovent HFA) 44 mcg/act inhaler Inhale 2 puffs 2 (two) times a day, Starting Wed 6/1/2022, Until Thu 6/1/2023, Historical Med      !! ibuprofen (MOTRIN) 800 mg tablet Take 800 mg by mouth 2 (two) times a day as needed, Starting Thu 6/2/2022, Until Fri 6/2/2023 at 2359, Historical Med      losartan (COZAAR) 25 mg tablet Take 25 mg by mouth daily, Starting Wed 6/1/2022, Until Thu 6/1/2023, Historical Med      NIFEdipine (PROCARDIA XL) 30 mg 24 hr tablet Take 30 mg by mouth daily, Starting Mon 4/13/2020, Until Tue 4/13/2021, Historical Med      ondansetron (ZOFRAN) 8 mg tablet Take 8 mg by mouth every 12 (twelve) hours as needed, Starting Thu 6/2/2022, Historical Med      pantoprazole (PROTONIX) 40 mg tablet Take 40 mg by mouth daily, Starting Wed 6/1/2022, Until Thu 6/1/2023, Historical Med      prochlorperazine (COMPAZINE) 5 mg tablet Take 5 mg by mouth every 6 (six) hours as needed, Starting Wed 9/14/2022, Until Wed 9/21/2022 at 2359, Historical Med      propranolol (INDERAL) 80 mg tablet Take 80 mg by mouth 2 (two) times a day, Starting Mon 1/27/2020, Until Tue 1/26/2021, Historical Med      rizatriptan (MAXALT) 10 mg tablet Take 10 mg by mouth, Starting Thu 6/2/2022, Until Fri 6/2/2023 at 2359, Historical Med       !! - Potential duplicate medications found  Please discuss with provider  No discharge procedures on file      Electronically Signed by       Osmar Rodriguez PA-C  11/21/22 [id number]

## 2023-05-11 ENCOUNTER — HOSPITAL ENCOUNTER (EMERGENCY)
Facility: HOSPITAL | Age: 28
Discharge: HOME | End: 2023-05-11
Attending: INTERNAL MEDICINE

## 2023-05-11 VITALS
WEIGHT: 291 LBS | BODY MASS INDEX: 53.22 KG/M2 | OXYGEN SATURATION: 98 % | RESPIRATION RATE: 18 BRPM | TEMPERATURE: 97.9 F | SYSTOLIC BLOOD PRESSURE: 178 MMHG | DIASTOLIC BLOOD PRESSURE: 106 MMHG | HEART RATE: 75 BPM

## 2023-05-11 DIAGNOSIS — R51.9 HEADACHE: ICD-10-CM

## 2023-05-11 DIAGNOSIS — H66.91 RIGHT OTITIS MEDIA: Primary | ICD-10-CM

## 2023-05-11 RX ORDER — AMOXICILLIN 500 MG/1
500 CAPSULE ORAL EVERY 12 HOURS SCHEDULED
Qty: 20 CAPSULE | Refills: 0 | Status: SHIPPED | OUTPATIENT
Start: 2023-05-11 | End: 2023-05-21

## 2023-05-11 RX ORDER — KETOROLAC TROMETHAMINE 30 MG/ML
30 INJECTION, SOLUTION INTRAMUSCULAR; INTRAVENOUS ONCE
Status: COMPLETED | OUTPATIENT
Start: 2023-05-11 | End: 2023-05-11

## 2023-05-11 RX ADMIN — KETOROLAC TROMETHAMINE 30 MG: 30 INJECTION, SOLUTION INTRAMUSCULAR; INTRAVENOUS at 09:37

## 2023-05-11 NOTE — Clinical Note
Cristel Salazar was seen and treated in our emergency department on 5/11/2023  Diagnosis:     Vandat    She may return on this date: 05/12/2023         If you have any questions or concerns, please don't hesitate to call        Vincent Payne MD    ______________________________           _______________          _______________  [facility] [place] [facility]                              Date                                Time

## 2023-05-11 NOTE — ED PROVIDER NOTES
History  Chief Complaint   Patient presents with   • Earache     Since last night     HPI  27-year-old woman with a history of hypertension, hyperlipidemia, morbid obesity and migraines presents to ED for evaluation of right-sided earache  She reports ear pain started last night around 6 PM   Not associated with any ear drainage  No decrease in hearing  Reports migraine  She reports a throbbing headache that originates in the occipital area  She states she has a history of migraines and took rizatriptan with only mild relief  She denies any associated nausea or vomiting  She does report associated photophobia  No other complaints or concerns at this time  Prior to Admission Medications   Prescriptions Last Dose Informant Patient Reported? Taking?    Dulaglutide (Trulicity) 1 5 GA/6 9PF SOPN   Yes No   Sig: Inject 1 5 mg under the skin Once a week   NIFEdipine (PROCARDIA XL) 30 mg 24 hr tablet   Yes No   Sig: Take 30 mg by mouth daily   acetaZOLAMIDE (DIAMOX) 250 mg tablet   Yes No   Si tablet at night for 1 week then 1 tablet twice daily for 1 week then 1 tablet in the morning 2 tablets at night for 1 week then 2 tablets twice daily and continue   acetaminophen (TYLENOL) 500 mg tablet   No No   Sig: Take 1 tablet (500 mg total) by mouth every 6 (six) hours as needed for mild pain or moderate pain   albuterol (2 5 mg/3 mL) 0 083 % nebulizer solution   No No   Sig: Take 3 mL (2 5 mg total) by nebulization every 6 (six) hours as needed for wheezing or shortness of breath   albuterol (ProAir HFA) 90 mcg/act inhaler   No No   Sig: Inhale 2 puffs every 6 (six) hours as needed for wheezing   benzonatate (TESSALON PERLES) 100 mg capsule   No No   Sig: Take 1 capsule (100 mg total) by mouth 3 (three) times a day as needed for cough   busPIRone (BUSPAR) 10 mg tablet   Yes No   butalbital-acetaminophen-caffeine (Esgic) -40 mg per tablet   No No   Sig: Take 1 tablet by mouth every 4 (four) hours as needed for headaches   diphenhydrAMINE (SOMINEX) 25 MG tablet   Yes No   Sig: Take 25 mg by mouth every 6 (six) hours as needed   fluticasone (FLONASE) 50 mcg/act nasal spray   No No   Si spray into each nostril daily   fluticasone (Flovent HFA) 44 mcg/act inhaler   Yes No   Sig: Inhale 2 puffs 2 (two) times a day   guaiFENesin (MUCINEX) 600 mg 12 hr tablet   No No   Sig: Take 2 tablets (1,200 mg total) by mouth every 12 (twelve) hours   ibuprofen (MOTRIN) 600 mg tablet   No No   Sig: Take 1 tablet (600 mg total) by mouth every 6 (six) hours as needed for mild pain or moderate pain   ibuprofen (MOTRIN) 800 mg tablet   Yes No   Sig: Take 800 mg by mouth 2 (two) times a day as needed   losartan (COZAAR) 25 mg tablet   Yes No   Sig: Take 25 mg by mouth daily   menthol-cetylpyridinium (CEPACOL) 3 MG lozenge   No No   Sig: Take 1 lozenge (3 mg total) by mouth as needed for sore throat   ondansetron (ZOFRAN) 8 mg tablet   Yes No   Sig: Take 8 mg by mouth every 12 (twelve) hours as needed   pantoprazole (PROTONIX) 40 mg tablet   Yes No   Sig: Take 40 mg by mouth daily   prochlorperazine (COMPAZINE) 5 mg tablet   Yes No   Sig: Take 5 mg by mouth every 6 (six) hours as needed   propranolol (INDERAL) 80 mg tablet   Yes No   Sig: Take 80 mg by mouth 2 (two) times a day   rizatriptan (MAXALT) 10 mg tablet   Yes No   Sig: Take 10 mg by mouth      Facility-Administered Medications: None       Past Medical History:   Diagnosis Date   • Abnormal CT of the head    • Analgesic overuse headache    • Asthma    • Chronic pain of both shoulders    • Hyperlipidemia    • Hypertension    • Obesity, morbid, BMI 50 or higher (HCC)    • Urethral diverticulum        Past Surgical History:   Procedure Laterality Date   •  SECTION         History reviewed  No pertinent family history  I have reviewed and agree with the history as documented      E-Cigarette/Vaping   • E-Cigarette Use Former User      E-Cigarette/Vaping Substances     Social History     Tobacco Use   • Smoking status: Former   • Smokeless tobacco: Never   Vaping Use   • Vaping Use: Former   Substance Use Topics   • Alcohol use: Yes   • Drug use: Not Currently     Types: Marijuana       Review of Systems   All other systems reviewed and are negative  Physical Exam  Physical Exam   PHYSICAL EXAM    Constitutional:  Well developed, well nourished, no acute distress, non-toxic appearance    HEENT:  Conjunctiva normal  Oropharynx moist   Right TM with erythema, middle ear effusion  Left TM normal  Respiratory:  No respiratory distress, normal breath sounds  Cardiovascular:  Normal rate, normal rhythm, no murmurs  GI:  Soft, nondistended, normal bowel sounds, nontender  :  No costovertebral angle tenderness   Musculoskeletal:  No edema, no tenderness, no deformities  Integument:  Well hydrated, no rash   Lymphatic:  No lymphadenopathy noted   Neurologic:  Alert & oriented, normal motor function, normal sensory function, no focal deficits noted   Psychiatric:  Speech and behavior appropriate       Vital Signs  ED Triage Vitals [05/11/23 0904]   Temperature Pulse Respirations Blood Pressure SpO2   97 9 °F (36 6 °C) 75 18 (!) 178/106 98 %      Temp Source Heart Rate Source Patient Position - Orthostatic VS BP Location FiO2 (%)   Tympanic Monitor Sitting Left arm --      Pain Score       7           Vitals:    05/11/23 0904   BP: (!) 178/106   Pulse: 75   Patient Position - Orthostatic VS: Sitting         Visual Acuity      ED Medications  Medications   ketorolac (TORADOL) injection 30 mg (30 mg Intramuscular Given 5/11/23 0113)       Diagnostic Studies  Results Reviewed     None                 No orders to display              Procedures  Procedures         ED Course                                             Medical Decision Making  Differential diagnosis includes acute otitis media, otitis externa, upper respiratory infection  Headache and is consistent with previous migraines  Differential diagnosis includes migraine, tension headache, respiratory infection, referred pain from otitis media  Headache: acute illness or injury  Right otitis media: acute illness or injury  Risk  Prescription drug management  Disposition  Final diagnoses:   Right otitis media   Headache     Time reflects when diagnosis was documented in both MDM as applicable and the Disposition within this note     Time User Action Codes Description Comment    5/11/2023  9:12 AM Francois Carr Add [H66 91] Right otitis media     5/11/2023  9:12 AM Francois Carr Add [R51 9] Headache       ED Disposition     ED Disposition   Discharge    Condition   Stable    Date/Time   Thu May 11, 2023  9:12 AM    Comment   Sha Muñoz discharge to home/self care                 Follow-up Information     Follow up With Specialties Details Why [address] [facility] Call  As needed [address]  [phone]            Discharge Medication List as of 5/11/2023  9:14 AM      START taking these medications    Details   amoxicillin (AMOXIL) 500 mg capsule Take 1 capsule (500 mg total) by mouth every 12 (twelve) hours for 10 days, Starting Thu 5/11/2023, Until Sun 5/21/2023, Normal         CONTINUE these medications which have NOT CHANGED    Details   acetaminophen (TYLENOL) 500 mg tablet Take 1 tablet (500 mg total) by mouth every 6 (six) hours as needed for mild pain or moderate pain, Starting Mon 11/21/2022, Normal      acetaZOLAMIDE (DIAMOX) 250 mg tablet 1 tablet at night for 1 week then 1 tablet twice daily for 1 week then 1 tablet in the morning 2 tablets at night for 1 week then 2 tablets twice daily and continue, Historical Med      albuterol (2 5 mg/3 mL) 0 083 % nebulizer solution Take 3 mL (2 5 mg total) by nebulization every 6 (six) hours as needed for wheezing or shortness of breath, Starting Sat 9/17/2022, Print      albuterol (ProAir HFA) 90 mcg/act inhaler Inhale 2 puffs every 6 (six) hours as needed for wheezing, Starting Sat 9/17/2022, Print      benzonatate (TESSALON PERLES) 100 mg capsule Take 1 capsule (100 mg total) by mouth 3 (three) times a day as needed for cough, Starting Sat 9/17/2022, Print      busPIRone (BUSPAR) 10 mg tablet Starting Thu 8/18/2022, Historical Med      butalbital-acetaminophen-caffeine (Esgic) -40 mg per tablet Take 1 tablet by mouth every 4 (four) hours as needed for headaches, Starting Sat 11/27/2021, Normal      diphenhydrAMINE (SOMINEX) 25 MG tablet Take 25 mg by mouth every 6 (six) hours as needed, Starting Wed 9/14/2022, Until Fri 10/14/2022 at 2359, Historical Med      Dulaglutide (Trulicity) 1 5 MCCRARY/2 7AH SOPN Inject 1 5 mg under the skin Once a week, Starting Sun 9/18/2022, Historical Med      fluticasone (FLONASE) 50 mcg/act nasal spray 1 spray into each nostril daily, Starting Mon 11/21/2022, Normal      fluticasone (Flovent HFA) 44 mcg/act inhaler Inhale 2 puffs 2 (two) times a day, Starting Wed 6/1/2022, Until Thu 6/1/2023, Historical Med      guaiFENesin (MUCINEX) 600 mg 12 hr tablet Take 2 tablets (1,200 mg total) by mouth every 12 (twelve) hours, Starting Mon 11/21/2022, Normal      !! ibuprofen (MOTRIN) 600 mg tablet Take 1 tablet (600 mg total) by mouth every 6 (six) hours as needed for mild pain or moderate pain, Starting Mon 11/21/2022, Normal      !! ibuprofen (MOTRIN) 800 mg tablet Take 800 mg by mouth 2 (two) times a day as needed, Starting Thu 6/2/2022, Until Fri 6/2/2023 at 2359, Historical Med      losartan (COZAAR) 25 mg tablet Take 25 mg by mouth daily, Starting Wed 6/1/2022, Until Thu 6/1/2023, Historical Med      menthol-cetylpyridinium (CEPACOL) 3 MG lozenge Take 1 lozenge (3 mg total) by mouth as needed for sore throat, Starting Mon 11/21/2022, Normal      NIFEdipine (PROCARDIA XL) 30 mg 24 hr tablet Take 30 mg by mouth daily, Starting Mon 4/13/2020, Until Tue 4/13/2021, Historical Med ondansetron (ZOFRAN) 8 mg tablet Take 8 mg by mouth every 12 (twelve) hours as needed, Starting Thu 6/2/2022, Historical Med      pantoprazole (PROTONIX) 40 mg tablet Take 40 mg by mouth daily, Starting Wed 6/1/2022, Until Thu 6/1/2023, Historical Med      prochlorperazine (COMPAZINE) 5 mg tablet Take 5 mg by mouth every 6 (six) hours as needed, Starting Wed 9/14/2022, Until Wed 9/21/2022 at 2359, Historical Med      propranolol (INDERAL) 80 mg tablet Take 80 mg by mouth 2 (two) times a day, Starting Mon 1/27/2020, Until Tue 1/26/2021, Historical Med      rizatriptan (MAXALT) 10 mg tablet Take 10 mg by mouth, Starting Thu 6/2/2022, Until Fri 6/2/2023 at 2359, Historical Med       !! - Potential duplicate medications found  Please discuss with provider  No discharge procedures on file      PDMP Review     None          ED Provider  Electronically Signed by           Moises Gustafson MD  05/11/23 2589

## 2023-07-10 ENCOUNTER — HOSPITAL ENCOUNTER (EMERGENCY)
Facility: HOSPITAL | Age: 28
Discharge: HOME | End: 2023-07-10
Attending: EMERGENCY MEDICINE
Payer: MEDICARE

## 2023-07-10 ENCOUNTER — APPOINTMENT (EMERGENCY)
Dept: RADIOLOGY | Facility: HOSPITAL | Age: 28
End: 2023-07-10
Payer: MEDICARE

## 2023-07-10 VITALS
HEART RATE: 89 BPM | TEMPERATURE: 97.9 F | SYSTOLIC BLOOD PRESSURE: 139 MMHG | RESPIRATION RATE: 19 BRPM | OXYGEN SATURATION: 98 % | DIASTOLIC BLOOD PRESSURE: 91 MMHG

## 2023-07-10 DIAGNOSIS — J02.9 VIRAL PHARYNGITIS: ICD-10-CM

## 2023-07-10 DIAGNOSIS — R11.2 NAUSEA & VOMITING: ICD-10-CM

## 2023-07-10 DIAGNOSIS — B34.9 VIRAL SYNDROME: Primary | ICD-10-CM

## 2023-07-10 DIAGNOSIS — R51.9 HEADACHE: ICD-10-CM

## 2023-07-10 LAB
ALBUMIN SERPL BCP-MCNC: 3.4 G/DL (ref 3.5–5)
ALP SERPL-CCNC: 97 U/L (ref 46–116)
ALT SERPL W P-5'-P-CCNC: 61 U/L (ref 12–78)
ANION GAP SERPL CALCULATED.3IONS-SCNC: 5 MMOL/L
AST SERPL W P-5'-P-CCNC: 27 U/L (ref 5–45)
BASOPHILS # BLD AUTO: 0.02 THOUSANDS/ÂΜL (ref 0–0.1)
BASOPHILS NFR BLD AUTO: 0 % (ref 0–1)
BILIRUB SERPL-MCNC: 0.36 MG/DL (ref 0.2–1)
BUN SERPL-MCNC: 13 MG/DL (ref 5–25)
CALCIUM ALBUM COR SERPL-MCNC: 9.8 MG/DL (ref 8.3–10.1)
CALCIUM SERPL-MCNC: 9.3 MG/DL (ref 8.3–10.1)
CHLORIDE SERPL-SCNC: 104 MMOL/L (ref 96–108)
CO2 SERPL-SCNC: 28 MMOL/L (ref 21–32)
CREAT SERPL-MCNC: 0.8 MG/DL (ref 0.6–1.3)
EOSINOPHIL # BLD AUTO: 0.17 THOUSAND/ÂΜL (ref 0–0.61)
EOSINOPHIL NFR BLD AUTO: 2 % (ref 0–6)
ERYTHROCYTE [DISTWIDTH] IN BLOOD BY AUTOMATED COUNT: 13.4 % (ref 11.6–15.1)
EXT PREGNANCY TEST URINE: NEGATIVE
EXT. CONTROL: NORMAL
GFR SERPL CREATININE-BSD FRML MDRD: 100 ML/MIN/1.73SQ M
GLUCOSE SERPL-MCNC: 170 MG/DL (ref 65–140)
HCT VFR BLD AUTO: 37.4 % (ref 34.8–46.1)
HGB BLD-MCNC: 12 G/DL (ref 11.5–15.4)
IMM GRANULOCYTES # BLD AUTO: 0.04 THOUSAND/UL (ref 0–0.2)
IMM GRANULOCYTES NFR BLD AUTO: 0 % (ref 0–2)
LIPASE SERPL-CCNC: 125 U/L (ref 73–393)
LYMPHOCYTES # BLD AUTO: 2.1 THOUSANDS/ÂΜL (ref 0.6–4.47)
LYMPHOCYTES NFR BLD AUTO: 21 % (ref 14–44)
MCH RBC QN AUTO: 27.3 PG (ref 26.8–34.3)
MCHC RBC AUTO-ENTMCNC: 32.1 G/DL (ref 31.4–37.4)
MCV RBC AUTO: 85 FL (ref 82–98)
MONOCYTES # BLD AUTO: 0.42 THOUSAND/ÂΜL (ref 0.17–1.22)
MONOCYTES NFR BLD AUTO: 4 % (ref 4–12)
NEUTROPHILS # BLD AUTO: 7.11 THOUSANDS/ÂΜL (ref 1.85–7.62)
NEUTS SEG NFR BLD AUTO: 73 % (ref 43–75)
NRBC BLD AUTO-RTO: 0 /100 WBCS
PLATELET # BLD AUTO: 221 THOUSANDS/UL (ref 149–390)
PMV BLD AUTO: 11.3 FL (ref 8.9–12.7)
POTASSIUM SERPL-SCNC: 3.9 MMOL/L (ref 3.5–5.3)
PROT SERPL-MCNC: 8.2 G/DL (ref 6.4–8.4)
RBC # BLD AUTO: 4.4 MILLION/UL (ref 3.81–5.12)
SODIUM SERPL-SCNC: 137 MMOL/L (ref 135–147)
WBC # BLD AUTO: 9.86 THOUSAND/UL (ref 4.31–10.16)

## 2023-07-10 PROCEDURE — 85025 COMPLETE CBC W/AUTO DIFF WBC: CPT | Performed by: EMERGENCY MEDICINE

## 2023-07-10 PROCEDURE — 96361 HYDRATE IV INFUSION ADD-ON: CPT

## 2023-07-10 PROCEDURE — 71046 X-RAY EXAM CHEST 2 VIEWS: CPT

## 2023-07-10 PROCEDURE — 99285 EMERGENCY DEPT VISIT HI MDM: CPT

## 2023-07-10 PROCEDURE — 81025 URINE PREGNANCY TEST: CPT

## 2023-07-10 PROCEDURE — 93005 ELECTROCARDIOGRAM TRACING: CPT

## 2023-07-10 PROCEDURE — 36415 COLL VENOUS BLD VENIPUNCTURE: CPT

## 2023-07-10 PROCEDURE — 96374 THER/PROPH/DIAG INJ IV PUSH: CPT

## 2023-07-10 PROCEDURE — 96375 TX/PRO/DX INJ NEW DRUG ADDON: CPT

## 2023-07-10 PROCEDURE — 80053 COMPREHEN METABOLIC PANEL: CPT | Performed by: EMERGENCY MEDICINE

## 2023-07-10 PROCEDURE — 83690 ASSAY OF LIPASE: CPT | Performed by: EMERGENCY MEDICINE

## 2023-07-10 RX ORDER — ACETAMINOPHEN 325 MG/1
650 TABLET ORAL ONCE
Status: COMPLETED | OUTPATIENT
Start: 2023-07-10 | End: 2023-07-10

## 2023-07-10 RX ORDER — DROPERIDOL 2.5 MG/ML
0.62 INJECTION, SOLUTION INTRAMUSCULAR; INTRAVENOUS ONCE
Status: COMPLETED | OUTPATIENT
Start: 2023-07-10 | End: 2023-07-10

## 2023-07-10 RX ORDER — KETOROLAC TROMETHAMINE 30 MG/ML
15 INJECTION, SOLUTION INTRAMUSCULAR; INTRAVENOUS ONCE
Status: COMPLETED | OUTPATIENT
Start: 2023-07-10 | End: 2023-07-10

## 2023-07-10 RX ADMIN — KETOROLAC TROMETHAMINE 15 MG: 30 INJECTION, SOLUTION INTRAMUSCULAR; INTRAVENOUS at 16:51

## 2023-07-10 RX ADMIN — ACETAMINOPHEN 650 MG: 325 TABLET, FILM COATED ORAL at 16:52

## 2023-07-10 RX ADMIN — SODIUM CHLORIDE 1000 ML: 0.9 INJECTION, SOLUTION INTRAVENOUS at 16:51

## 2023-07-10 RX ADMIN — DROPERIDOL 0.62 MG: 2.5 INJECTION, SOLUTION INTRAMUSCULAR; INTRAVENOUS at 16:49

## 2023-07-10 NOTE — DISCHARGE INSTRUCTIONS
Tylenol Motrin for symptomatic relief  Follow-up with outpatient doctor  If you have worsening symptoms come back to the ED.

## 2023-07-10 NOTE — ED PROVIDER NOTES
History  Chief Complaint   Patient presents with   • Vomiting     Pt c/o N/V and left sided chest pain since this morning. States that she is concerned that her BP is high. Taken at home 171/99   • Chest Pain     HPI see MDM    Prior to Admission Medications   Prescriptions Last Dose Informant Patient Reported? Taking?    Dulaglutide (Trulicity) 1.5 WP/5.6YK SOPN   Yes No   Sig: Inject 1.5 mg under the skin Once a week   NIFEdipine (PROCARDIA XL) 30 mg 24 hr tablet   Yes No   Sig: Take 30 mg by mouth daily   acetaZOLAMIDE (DIAMOX) 250 mg tablet   Yes No   Si tablet at night for 1 week then 1 tablet twice daily for 1 week then 1 tablet in the morning 2 tablets at night for 1 week then 2 tablets twice daily and continue   acetaminophen (TYLENOL) 500 mg tablet   No No   Sig: Take 1 tablet (500 mg total) by mouth every 6 (six) hours as needed for mild pain or moderate pain   albuterol (2.5 mg/3 mL) 0.083 % nebulizer solution   No No   Sig: Take 3 mL (2.5 mg total) by nebulization every 6 (six) hours as needed for wheezing or shortness of breath   albuterol (ProAir HFA) 90 mcg/act inhaler   No No   Sig: Inhale 2 puffs every 6 (six) hours as needed for wheezing   benzonatate (TESSALON PERLES) 100 mg capsule   No No   Sig: Take 1 capsule (100 mg total) by mouth 3 (three) times a day as needed for cough   busPIRone (BUSPAR) 10 mg tablet   Yes No   butalbital-acetaminophen-caffeine (Esgic) -40 mg per tablet   No No   Sig: Take 1 tablet by mouth every 4 (four) hours as needed for headaches   diphenhydrAMINE (SOMINEX) 25 MG tablet   Yes No   Sig: Take 25 mg by mouth every 6 (six) hours as needed   fluticasone (FLONASE) 50 mcg/act nasal spray   No No   Si spray into each nostril daily   fluticasone (Flovent HFA) 44 mcg/act inhaler   Yes No   Sig: Inhale 2 puffs 2 (two) times a day   guaiFENesin (MUCINEX) 600 mg 12 hr tablet   No No   Sig: Take 2 tablets (1,200 mg total) by mouth every 12 (twelve) hours   ibuprofen (MOTRIN) 600 mg tablet   No No   Sig: Take 1 tablet (600 mg total) by mouth every 6 (six) hours as needed for mild pain or moderate pain   ibuprofen (MOTRIN) 800 mg tablet   Yes No   Sig: Take 800 mg by mouth 2 (two) times a day as needed   losartan (COZAAR) 25 mg tablet   Yes No   Sig: Take 25 mg by mouth daily   menthol-cetylpyridinium (CEPACOL) 3 MG lozenge   No No   Sig: Take 1 lozenge (3 mg total) by mouth as needed for sore throat   ondansetron (ZOFRAN) 8 mg tablet   Yes No   Sig: Take 8 mg by mouth every 12 (twelve) hours as needed   prochlorperazine (COMPAZINE) 5 mg tablet   Yes No   Sig: Take 5 mg by mouth every 6 (six) hours as needed   propranolol (INDERAL) 80 mg tablet   Yes No   Sig: Take 80 mg by mouth 2 (two) times a day   rizatriptan (MAXALT) 10 mg tablet   Yes No   Sig: Take 10 mg by mouth      Facility-Administered Medications: None       Past Medical History:   Diagnosis Date   • Abnormal CT of the head    • Analgesic overuse headache    • Asthma    • Chronic pain of both shoulders    • Hyperlipidemia    • Hypertension    • Obesity, morbid, BMI 50 or higher (HCC)    • Urethral diverticulum        Past Surgical History:   Procedure Laterality Date   •  SECTION         No family history on file. I have reviewed and agree with the history as documented. E-Cigarette/Vaping   • E-Cigarette Use Former User      E-Cigarette/Vaping Substances     Social History     Tobacco Use   • Smoking status: Former   • Smokeless tobacco: Never   Vaping Use   • Vaping Use: Former   Substance Use Topics   • Alcohol use:  Yes   • Drug use: Not Currently     Types: Marijuana        Review of Systems    Physical Exam  ED Triage Vitals [07/10/23 1545]   Temperature Pulse Respirations Blood Pressure SpO2   97.9 °F (36.6 °C) 89 19 139/91 98 %      Temp Source Heart Rate Source Patient Position - Orthostatic VS BP Location FiO2 (%)   Temporal Monitor Lying Left arm --      Pain Score       8             Orthostatic Vital Signs  Vitals:    07/10/23 1545   BP: 139/91   Pulse: 89   Patient Position - Orthostatic VS: Lying       Physical Exam    ED Medications  Medications   sodium chloride 0.9 % bolus 1,000 mL (0 mL Intravenous Stopped 7/10/23 1814)   acetaminophen (TYLENOL) tablet 650 mg (650 mg Oral Given 7/10/23 1652)   ketorolac (TORADOL) injection 15 mg (15 mg Intravenous Given 7/10/23 1651)   droperidol (INAPSINE) injection 0.625 mg (0.625 mg Intravenous Given 7/10/23 1649)       Diagnostic Studies  Results Reviewed     Procedure Component Value Units Date/Time    POCT pregnancy, urine [[id number]]  (Normal) Resulted: 07/10/23 1650    Lab Status: Final result Updated: 07/10/23 1650     EXT Preg Test, Ur Negative     Control Valid    Comprehensive metabolic panel [[id number]]  (Abnormal) Collected: 07/10/23 1602    Lab Status: Final result Specimen: Blood from Arm, Right Updated: 07/10/23 1638     Sodium 137 mmol/L      Potassium 3.9 mmol/L      Chloride 104 mmol/L      CO2 28 mmol/L      ANION GAP 5 mmol/L      BUN 13 mg/dL      Creatinine 0.80 mg/dL      Glucose 170 mg/dL      Calcium 9.3 mg/dL      Corrected Calcium 9.8 mg/dL      AST 27 U/L      ALT 61 U/L      Alkaline Phosphatase 97 U/L      Total Protein 8.2 g/dL      Albumin 3.4 g/dL      Total Bilirubin 0.36 mg/dL      eGFR 100 ml/min/1.73sq m     Narrative:      [facility] guidelines for Chronic Kidney Disease (CKD):   •  Stage 1 with normal or high GFR (GFR > 90 mL/min/1.73 square meters)  •  Stage 2 Mild CKD (GFR = 60-89 mL/min/1.73 square meters)  •  Stage 3A Moderate CKD (GFR = 45-59 mL/min/1.73 square meters)  •  Stage 3B Moderate CKD (GFR = 30-44 mL/min/1.73 square meters)  •  Stage 4 Severe CKD (GFR = 15-29 mL/min/1.73 square meters)  •  Stage 5 End Stage CKD (GFR <15 mL/min/1.73 square meters)  Note: GFR calculation is accurate only with a steady state creatinine    Lipase [[id number]]  (Normal) Collected: 07/10/23 1602    Lab Status: Final result Specimen: Blood from Arm, Right Updated: 07/10/23 1638     Lipase 125 u/L     CBC and differential [[id number]] Collected: 07/10/23 1602    Lab Status: Final result Specimen: Blood from Arm, Right Updated: 07/10/23 1614     WBC 9.86 Thousand/uL      RBC 4.40 Million/uL      Hemoglobin 12.0 g/dL      Hematocrit 37.4 %      MCV 85 fL      MCH 27.3 pg      MCHC 32.1 g/dL      RDW 13.4 %      MPV 11.3 fL      Platelets 757 Thousands/uL      nRBC 0 /100 WBCs      Neutrophils Relative 73 %      Immat GRANS % 0 %      Lymphocytes Relative 21 %      Monocytes Relative 4 %      Eosinophils Relative 2 %      Basophils Relative 0 %      Neutrophils Absolute 7.11 Thousands/µL      Immature Grans Absolute 0.04 Thousand/uL      Lymphocytes Absolute 2.10 Thousands/µL      Monocytes Absolute 0.42 Thousand/µL      Eosinophils Absolute 0.17 Thousand/µL      Basophils Absolute 0.02 Thousands/µL                  XR chest 2 views    (Results Pending)         Procedures  Procedures      ED Course  ED Course as of 07/11/23 0037   Mon Jul 10, 2023   5878 The CXR was interpreted by me independently. On my read, it appears without acute abnormalities:  - The  cardiomediastinal  silhouette  is  unremarkable.    - The  lungs  are  clear. - No  pleural  effusions.  - No  pneumothorax. - The  pulmonary  vasculature  is  within  normal  limits.    - The  trachea  is  midline.    - Bony  thorax  is  unremarkable.                                   SBIRT 20yo+    Flowsheet Row Most Recent Value   Initial Alcohol Screen: US AUDIT-C     2. How many drinks containing alcohol do you have on a typical day you are drinking? 0 Filed at: 07/10/2023 1547   3a. Male UNDER 65: How often do you have five or more drinks on one occasion? 0 Filed at: 07/10/2023 1547   3b. FEMALE Any Age, or MALE 65+: How often do you have 4 or more drinks on one occassion?  0 Filed at: 07/10/2023 1547   Audit-C Score 0 Filed at: 07/10/2023 1547   AYAAN: How many times in the past year have you. .. Used an illegal drug or used a prescription medication for non-medical reasons? Never Filed at: 07/10/2023 1547                [facility]   Pt is a 59-year-old female presenting for cough, congestion, chest discomfort, headache , 2 episodes of NBNB emesis. No fever. No other associated symptoms. .  Describes headache as gradual in onset with no neurological symptoms      On exam   General: VSS, NAD, awake, alert. Talking normally. Head: Normocephalic, atraumatic, nontender. Eyes: EOM-No subconjunctival hemorrhages. ENT: Nose atraumatic. MMM, mild pharyngeal erythema  No malocclusion. No stridor. Normal phonation. No drooling. Normal swallowing. Neck: Trachea midline. No JVD. CV: RRR. Lungs: CTAB No tachypnea. No paradoxical motion. Abd: +BS, soft, NT/ND. No guarding/rigidity. MSK: Full ROM throughout. No lower extremity edema. Skin: Dry, intact. Neuro: AAOx3, GCS 15, CN II-XII grossly intact. Motor/sensory grossly intact. Psychiatric/Behavioral: mood/affect normal; behavior normal; thought content normal; judgement normal   Exam: deferred    Ddx: Viral pharyngitis, primary headache    Plan: Patient was symptomatically treated with droperidol and Tylenol Toradol. Chest x-ray unremarkable, no white count. Labs as interpreted by me unremarkable  EKG as interpreted by me This EKG was interpreted by me. The EKG demonstrates Normal sinus rhythm, normal intervals and axis, normal QRS, no acute ST changes present. Imaging as interpreted by me chest x-ray unremarkable no focal consolidation, no effusion  No pneumothorax. ED Course: Patient had symptomatic relief with medication. Discharged home with outpatient follow-up and return precautions. Final Dispo: Pt is hemodynamically stable and clear for discharge with outpatient f/u with their PCP.  Return precautions given pt verbalized understanding      Disposition  Final diagnoses:   Viral syndrome   Viral pharyngitis   Headache   Nausea & vomiting     Time reflects when diagnosis was documented in both MDM as applicable and the Disposition within this note     Time User Action Codes Description Comment    7/10/2023  6:02 PM Yamil Shant Add [B34.9] Viral syndrome     7/10/2023  6:02 PM CHARLESelliottAmelia walker Add [J02.9] Viral pharyngitis     7/10/2023  6:02 PM Amelia Pisano Add [R51.9] Headache     7/10/2023  6:02 PM Amelia Pisano Add [R11.2] Nausea & vomiting       ED Disposition     ED Disposition   Discharge    Condition   Stable    Date/Time   Mon Jul 10, 2023  6:02 PM    Comment   [address] discharge to home/self care.                Follow-up Information     Follow up With Specialties Details Why Contact Info Additional [address] [facility] In 1 week  [address] [facility] Emergency Medicine  If symptoms worsen [address] [facility] [id number]  [facility] Emergency Department, [address] [place] [address], [facility]          Discharge Medication List as of 7/10/2023  6:10 PM      CONTINUE these medications which have NOT CHANGED    Details   acetaminophen (TYLENOL) 500 mg tablet Take 1 tablet (500 mg total) by mouth every 6 (six) hours as needed for mild pain or moderate pain, Starting Mon 11/21/2022, Normal      acetaZOLAMIDE (DIAMOX) 250 mg tablet 1 tablet at night for 1 week then 1 tablet twice daily for 1 week then 1 tablet in the morning 2 tablets at night for 1 week then 2 tablets twice daily and continue, Historical Med      albuterol (2.5 mg/3 mL) 0.083 % nebulizer solution Take 3 mL (2.5 mg total) by nebulization every 6 (six) hours as needed for wheezing or shortness of breath, Starting Sat 9/17/2022, Print      albuterol (ProAir HFA) 90 mcg/act inhaler Inhale 2 puffs every 6 (six) hours as needed for wheezing, Starting Sat 9/17/2022, Print      benzonatate (TESSALON PERLES) 100 mg capsule Take 1 capsule (100 mg total) by mouth 3 (three) times a day as needed for cough, Starting Sat 9/17/2022, Print      busPIRone (BUSPAR) 10 mg tablet Starting Thu 8/18/2022, Historical Med      butalbital-acetaminophen-caffeine (Esgic) -40 mg per tablet Take 1 tablet by mouth every 4 (four) hours as needed for headaches, Starting Sat 11/27/2021, Normal      diphenhydrAMINE (SOMINEX) 25 MG tablet Take 25 mg by mouth every 6 (six) hours as needed, Starting Wed 9/14/2022, Until Fri 10/14/2022 at 2359, Historical Med      Dulaglutide (Trulicity) 1.5 UY/0.9ZT SOPN Inject 1.5 mg under the skin Once a week, Starting Sun 9/18/2022, Historical Med      fluticasone (FLONASE) 50 mcg/act nasal spray 1 spray into each nostril daily, Starting Mon 11/21/2022, Normal      fluticasone (Flovent HFA) 44 mcg/act inhaler Inhale 2 puffs 2 (two) times a day, Starting Wed 6/1/2022, Until Thu 6/1/2023, Historical Med      guaiFENesin (MUCINEX) 600 mg 12 hr tablet Take 2 tablets (1,200 mg total) by mouth every 12 (twelve) hours, Starting Mon 11/21/2022, Normal      ibuprofen (MOTRIN) 600 mg tablet Take 1 tablet (600 mg total) by mouth every 6 (six) hours as needed for mild pain or moderate pain, Starting Mon 11/21/2022, Normal      losartan (COZAAR) 25 mg tablet Take 25 mg by mouth daily, Starting Wed 6/1/2022, Until Thu 6/1/2023, Historical Med      menthol-cetylpyridinium (CEPACOL) 3 MG lozenge Take 1 lozenge (3 mg total) by mouth as needed for sore throat, Starting Mon 11/21/2022, Normal      NIFEdipine (PROCARDIA XL) 30 mg 24 hr tablet Take 30 mg by mouth daily, Starting Mon 4/13/2020, Until Tue 4/13/2021, Historical Med      ondansetron (ZOFRAN) 8 mg tablet Take 8 mg by mouth every 12 (twelve) hours as needed, Starting Thu 6/2/2022, Historical Med      prochlorperazine (COMPAZINE) 5 mg tablet Take 5 mg by mouth every 6 (six) hours as needed, Starting Wed 9/14/2022, Until Wed 9/21/2022 at 2359, Historical Med      propranolol (INDERAL) 80 mg tablet Take 80 mg by mouth 2 (two) times a day, Starting Mon 1/27/2020, Until Tue 1/26/2021, Historical Med      rizatriptan (MAXALT) 10 mg tablet Take 10 mg by mouth, Starting Thu 6/2/2022, Until Fri 6/2/2023 at 2359, Historical Med           No discharge procedures on file. PDMP Review     None           ED Provider  Attending physically available and evaluated Makayla Lima. I managed the patient along with the ED Attending.     Electronically Signed by         Camila Vanessa DO  07/11/23 0038

## 2023-07-10 NOTE — ED ATTENDING ATTESTATION
7/10/2023  I, Maddie Osei MD, saw and evaluated the patient. I have discussed the patient with the resident/non-physician practitioner and agree with the resident's/non-physician practitioner's findings, Plan of Care, and MDM as documented in the resident's/non-physician practitioner's note, except where noted. All available labs and Radiology studies were reviewed. I was present for key portions of any procedure(s) performed by the resident/non-physician practitioner and I was immediately available to provide assistance. At this point I agree with the current assessment done in the Emergency Department.   I have conducted an independent evaluation of this patient a history and physical is as follows:  Cough  Congestion    Cp with a cough    Has chronic HA migraine headaches and a history of idiopathic intracranial hypertension patient apparently had a lumbar puncture on 8/24/2022 with elevated opening pressures of 33 she was started on Diamox and followed by neurology  Primarily here with viral type symptoms no fever no chills mild sore throat  No myalgias no visual changes  2 episodes of nonbilious nonbloody vomiting no abdominal pain no urinary symptoms no vaginal bleeding  Exam the patient is in no acute distress HEENT exam pupils equal round reactive light extraocular muscle intact neck is supple lungs clear heart regular no murmurs gallops or rubs abdomen soft and nontender positive bowel sounds  Neurologic Aubrey cranial nerves II 12 intact motor 5-5 sensory grossly intact and cerebellar testing normal including gait  Impression headache  Cough congestion with skill skeletal chest pain    EKG is normal similar to old  Chest x-ray and symptomatic treatment  ED Course         Critical Care Time  Procedures

## 2023-07-11 LAB
ATRIAL RATE: 89 BPM
P AXIS: 36 DEGREES
PR INTERVAL: 150 MS
QRS AXIS: 59 DEGREES
QRSD INTERVAL: 90 MS
QT INTERVAL: 374 MS
QTC INTERVAL: 455 MS
T WAVE AXIS: 25 DEGREES
VENTRICULAR RATE: 89 BPM

## 2023-07-11 PROCEDURE — 93010 ELECTROCARDIOGRAM REPORT: CPT | Performed by: INTERNAL MEDICINE

## 2024-05-05 ENCOUNTER — HOSPITAL ENCOUNTER (OUTPATIENT)
Facility: HOSPITAL | Age: 29
Setting detail: OBSERVATION
End: 2024-05-08
Attending: EMERGENCY MEDICINE | Admitting: INTERNAL MEDICINE
Payer: MEDICARE

## 2024-05-05 DIAGNOSIS — R52 INTRACTABLE PAIN: ICD-10-CM

## 2024-05-05 DIAGNOSIS — N20.0 NEPHROLITHIASIS: Primary | ICD-10-CM

## 2024-05-05 PROCEDURE — 99284 EMERGENCY DEPT VISIT MOD MDM: CPT

## 2024-05-05 PROCEDURE — 81025 URINE PREGNANCY TEST: CPT | Performed by: PHYSICIAN ASSISTANT

## 2024-05-05 RX ORDER — KETOROLAC TROMETHAMINE 30 MG/ML
15 INJECTION, SOLUTION INTRAMUSCULAR; INTRAVENOUS ONCE
Status: COMPLETED | OUTPATIENT
Start: 2024-05-06 | End: 2024-05-06

## 2024-05-05 RX ORDER — ONDANSETRON 2 MG/ML
4 INJECTION INTRAMUSCULAR; INTRAVENOUS ONCE
Status: COMPLETED | OUTPATIENT
Start: 2024-05-06 | End: 2024-05-06

## 2024-05-06 ENCOUNTER — APPOINTMENT (EMERGENCY)
Dept: CT IMAGING | Facility: HOSPITAL | Age: 29
End: 2024-05-06
Payer: MEDICARE

## 2024-05-06 PROBLEM — Z79.4 TYPE 2 DIABETES MELLITUS WITHOUT COMPLICATION, WITH LONG-TERM CURRENT USE OF INSULIN (HCC): Status: ACTIVE | Noted: 2024-04-25

## 2024-05-06 PROBLEM — E11.9 TYPE 2 DIABETES MELLITUS WITHOUT COMPLICATION, WITH LONG-TERM CURRENT USE OF INSULIN (HCC): Chronic | Status: ACTIVE | Noted: 2024-04-25

## 2024-05-06 PROBLEM — E11.9 TYPE 2 DIABETES MELLITUS WITHOUT COMPLICATION, WITH LONG-TERM CURRENT USE OF INSULIN (HCC): Status: ACTIVE | Noted: 2024-04-25

## 2024-05-06 PROBLEM — Z79.4 TYPE 2 DIABETES MELLITUS WITHOUT COMPLICATION, WITH LONG-TERM CURRENT USE OF INSULIN (HCC): Chronic | Status: ACTIVE | Noted: 2024-04-25

## 2024-05-06 PROBLEM — K21.9 GERD (GASTROESOPHAGEAL REFLUX DISEASE): Chronic | Status: ACTIVE | Noted: 2022-09-26

## 2024-05-06 PROBLEM — N20.1 CALCULUS OF URETEROVESICAL JUNCTION (UVJ): Status: ACTIVE | Noted: 2024-05-06

## 2024-05-06 PROBLEM — I10 HYPERTENSION, ESSENTIAL: Chronic | Status: ACTIVE | Noted: 2019-06-27

## 2024-05-06 PROBLEM — E66.01 CLASS 3 SEVERE OBESITY DUE TO EXCESS CALORIES WITHOUT SERIOUS COMORBIDITY WITH BODY MASS INDEX (BMI) OF 50.0 TO 59.9 IN ADULT (HCC): Chronic | Status: ACTIVE | Noted: 2019-06-28

## 2024-05-06 LAB
ALBUMIN SERPL BCP-MCNC: 4.2 G/DL (ref 3.5–5)
ALP SERPL-CCNC: 94 U/L (ref 34–104)
ALT SERPL W P-5'-P-CCNC: 56 U/L (ref 7–52)
ANION GAP SERPL CALCULATED.3IONS-SCNC: 9 MMOL/L (ref 4–13)
ANION GAP SERPL CALCULATED.3IONS-SCNC: 9 MMOL/L (ref 4–13)
AST SERPL W P-5'-P-CCNC: 26 U/L (ref 13–39)
BACTERIA UR QL AUTO: ABNORMAL /HPF
BASOPHILS # BLD AUTO: 0.02 THOUSANDS/ÂΜL (ref 0–0.1)
BASOPHILS NFR BLD AUTO: 0 % (ref 0–1)
BILIRUB SERPL-MCNC: 0.24 MG/DL (ref 0.2–1)
BILIRUB UR QL STRIP: NEGATIVE
BUN SERPL-MCNC: 20 MG/DL (ref 5–25)
BUN SERPL-MCNC: 22 MG/DL (ref 5–25)
CALCIUM SERPL-MCNC: 8.6 MG/DL (ref 8.4–10.2)
CALCIUM SERPL-MCNC: 9.3 MG/DL (ref 8.4–10.2)
CHLORIDE SERPL-SCNC: 106 MMOL/L (ref 96–108)
CHLORIDE SERPL-SCNC: 108 MMOL/L (ref 96–108)
CLARITY UR: CLEAR
CO2 SERPL-SCNC: 22 MMOL/L (ref 21–32)
CO2 SERPL-SCNC: 23 MMOL/L (ref 21–32)
COLOR UR: ABNORMAL
CREAT SERPL-MCNC: 0.97 MG/DL (ref 0.6–1.3)
CREAT SERPL-MCNC: 0.97 MG/DL (ref 0.6–1.3)
EOSINOPHIL # BLD AUTO: 0.17 THOUSAND/ÂΜL (ref 0–0.61)
EOSINOPHIL NFR BLD AUTO: 1 % (ref 0–6)
ERYTHROCYTE [DISTWIDTH] IN BLOOD BY AUTOMATED COUNT: 13.4 % (ref 11.6–15.1)
ERYTHROCYTE [DISTWIDTH] IN BLOOD BY AUTOMATED COUNT: 13.5 % (ref 11.6–15.1)
EXT PREGNANCY TEST URINE: NEGATIVE
EXT. CONTROL: NORMAL
GFR SERPL CREATININE-BSD FRML MDRD: 79 ML/MIN/1.73SQ M
GFR SERPL CREATININE-BSD FRML MDRD: 79 ML/MIN/1.73SQ M
GLUCOSE P FAST SERPL-MCNC: 107 MG/DL (ref 65–99)
GLUCOSE SERPL-MCNC: 107 MG/DL (ref 65–140)
GLUCOSE SERPL-MCNC: 129 MG/DL (ref 65–140)
GLUCOSE SERPL-MCNC: 162 MG/DL (ref 65–140)
GLUCOSE SERPL-MCNC: 86 MG/DL (ref 65–140)
GLUCOSE SERPL-MCNC: 98 MG/DL (ref 65–140)
GLUCOSE SERPL-MCNC: 99 MG/DL (ref 65–140)
GLUCOSE UR STRIP-MCNC: NEGATIVE MG/DL
HCT VFR BLD AUTO: 36.6 % (ref 34.8–46.1)
HCT VFR BLD AUTO: 37.5 % (ref 34.8–46.1)
HGB BLD-MCNC: 10.9 G/DL (ref 11.5–15.4)
HGB BLD-MCNC: 11.4 G/DL (ref 11.5–15.4)
HGB UR QL STRIP.AUTO: 250
IMM GRANULOCYTES # BLD AUTO: 0.06 THOUSAND/UL (ref 0–0.2)
IMM GRANULOCYTES NFR BLD AUTO: 1 % (ref 0–2)
KETONES UR STRIP-MCNC: NEGATIVE MG/DL
LEUKOCYTE ESTERASE UR QL STRIP: NEGATIVE
LIPASE SERPL-CCNC: 71 U/L (ref 11–82)
LYMPHOCYTES # BLD AUTO: 2.37 THOUSANDS/ÂΜL (ref 0.6–4.47)
LYMPHOCYTES NFR BLD AUTO: 19 % (ref 14–44)
MCH RBC QN AUTO: 26.8 PG (ref 26.8–34.3)
MCH RBC QN AUTO: 27.7 PG (ref 26.8–34.3)
MCHC RBC AUTO-ENTMCNC: 29.8 G/DL (ref 31.4–37.4)
MCHC RBC AUTO-ENTMCNC: 30.4 G/DL (ref 31.4–37.4)
MCV RBC AUTO: 90 FL (ref 82–98)
MCV RBC AUTO: 91 FL (ref 82–98)
MONOCYTES # BLD AUTO: 0.55 THOUSAND/ÂΜL (ref 0.17–1.22)
MONOCYTES NFR BLD AUTO: 5 % (ref 4–12)
NEUTROPHILS # BLD AUTO: 9.08 THOUSANDS/ÂΜL (ref 1.85–7.62)
NEUTS SEG NFR BLD AUTO: 74 % (ref 43–75)
NITRITE UR QL STRIP: NEGATIVE
NON-SQ EPI CELLS URNS QL MICRO: ABNORMAL /HPF
NRBC BLD AUTO-RTO: 0 /100 WBCS
PH UR STRIP.AUTO: 7 [PH]
PLATELET # BLD AUTO: 232 THOUSANDS/UL (ref 149–390)
PLATELET # BLD AUTO: 234 THOUSANDS/UL (ref 149–390)
PMV BLD AUTO: 11 FL (ref 8.9–12.7)
PMV BLD AUTO: 11.7 FL (ref 8.9–12.7)
POTASSIUM SERPL-SCNC: 3.4 MMOL/L (ref 3.5–5.3)
POTASSIUM SERPL-SCNC: 3.6 MMOL/L (ref 3.5–5.3)
PROT SERPL-MCNC: 7.9 G/DL (ref 6.4–8.4)
PROT UR STRIP-MCNC: NEGATIVE MG/DL
RBC # BLD AUTO: 4.07 MILLION/UL (ref 3.81–5.12)
RBC # BLD AUTO: 4.11 MILLION/UL (ref 3.81–5.12)
RBC #/AREA URNS AUTO: ABNORMAL /HPF
SODIUM SERPL-SCNC: 138 MMOL/L (ref 135–147)
SODIUM SERPL-SCNC: 139 MMOL/L (ref 135–147)
SP GR UR STRIP.AUTO: 1.01 (ref 1–1.04)
UROBILINOGEN UA: NEGATIVE MG/DL
WBC # BLD AUTO: 12.08 THOUSAND/UL (ref 4.31–10.16)
WBC # BLD AUTO: 12.25 THOUSAND/UL (ref 4.31–10.16)
WBC #/AREA URNS AUTO: ABNORMAL /HPF

## 2024-05-06 PROCEDURE — 85027 COMPLETE CBC AUTOMATED: CPT | Performed by: PHYSICIAN ASSISTANT

## 2024-05-06 PROCEDURE — 36415 COLL VENOUS BLD VENIPUNCTURE: CPT | Performed by: PHYSICIAN ASSISTANT

## 2024-05-06 PROCEDURE — 82948 REAGENT STRIP/BLOOD GLUCOSE: CPT

## 2024-05-06 PROCEDURE — 99223 1ST HOSP IP/OBS HIGH 75: CPT | Performed by: INTERNAL MEDICINE

## 2024-05-06 PROCEDURE — 74176 CT ABD & PELVIS W/O CONTRAST: CPT

## 2024-05-06 PROCEDURE — 80053 COMPREHEN METABOLIC PANEL: CPT | Performed by: PHYSICIAN ASSISTANT

## 2024-05-06 PROCEDURE — 96361 HYDRATE IV INFUSION ADD-ON: CPT

## 2024-05-06 PROCEDURE — 83036 HEMOGLOBIN GLYCOSYLATED A1C: CPT | Performed by: PHYSICIAN ASSISTANT

## 2024-05-06 PROCEDURE — 96375 TX/PRO/DX INJ NEW DRUG ADDON: CPT

## 2024-05-06 PROCEDURE — 81001 URINALYSIS AUTO W/SCOPE: CPT | Performed by: PHYSICIAN ASSISTANT

## 2024-05-06 PROCEDURE — 83690 ASSAY OF LIPASE: CPT | Performed by: PHYSICIAN ASSISTANT

## 2024-05-06 PROCEDURE — NC001 PR NO CHARGE: Performed by: UROLOGY

## 2024-05-06 PROCEDURE — 85025 COMPLETE CBC W/AUTO DIFF WBC: CPT | Performed by: PHYSICIAN ASSISTANT

## 2024-05-06 PROCEDURE — 96374 THER/PROPH/DIAG INJ IV PUSH: CPT

## 2024-05-06 PROCEDURE — 80048 BASIC METABOLIC PNL TOTAL CA: CPT | Performed by: PHYSICIAN ASSISTANT

## 2024-05-06 PROCEDURE — 99285 EMERGENCY DEPT VISIT HI MDM: CPT | Performed by: PHYSICIAN ASSISTANT

## 2024-05-06 RX ORDER — BUSPIRONE HYDROCHLORIDE 15 MG/1
15 TABLET ORAL 2 TIMES DAILY
COMMUNITY
Start: 2024-02-08

## 2024-05-06 RX ORDER — DOXEPIN HYDROCHLORIDE 75 MG/1
75 CAPSULE ORAL
COMMUNITY
Start: 2023-11-16

## 2024-05-06 RX ORDER — ONDANSETRON 2 MG/ML
4 INJECTION INTRAMUSCULAR; INTRAVENOUS EVERY 6 HOURS PRN
Status: DISCONTINUED | OUTPATIENT
Start: 2024-05-06 | End: 2024-05-08 | Stop reason: HOSPADM

## 2024-05-06 RX ORDER — INSULIN LISPRO 100 [IU]/ML
5 INJECTION, SOLUTION INTRAVENOUS; SUBCUTANEOUS
Status: DISCONTINUED | OUTPATIENT
Start: 2024-05-06 | End: 2024-05-08 | Stop reason: HOSPADM

## 2024-05-06 RX ORDER — INSULIN LISPRO 100 [IU]/ML
1-6 INJECTION, SOLUTION INTRAVENOUS; SUBCUTANEOUS
Status: DISCONTINUED | OUTPATIENT
Start: 2024-05-06 | End: 2024-05-08 | Stop reason: HOSPADM

## 2024-05-06 RX ORDER — FAMOTIDINE 20 MG/1
20 TABLET, FILM COATED ORAL
COMMUNITY
Start: 2024-02-05 | End: 2025-02-04

## 2024-05-06 RX ORDER — DOXEPIN HYDROCHLORIDE 25 MG/1
75 CAPSULE ORAL
Status: DISCONTINUED | OUTPATIENT
Start: 2024-05-06 | End: 2024-05-08 | Stop reason: HOSPADM

## 2024-05-06 RX ORDER — NIFEDIPINE 30 MG/1
60 TABLET, EXTENDED RELEASE ORAL DAILY
Status: DISCONTINUED | OUTPATIENT
Start: 2024-05-06 | End: 2024-05-08 | Stop reason: HOSPADM

## 2024-05-06 RX ORDER — INSULIN LISPRO 100 [IU]/ML
2-12 INJECTION, SOLUTION INTRAVENOUS; SUBCUTANEOUS
Status: DISCONTINUED | OUTPATIENT
Start: 2024-05-06 | End: 2024-05-08 | Stop reason: HOSPADM

## 2024-05-06 RX ORDER — OXYCODONE HYDROCHLORIDE 5 MG/1
5 TABLET ORAL EVERY 4 HOURS PRN
Status: DISCONTINUED | OUTPATIENT
Start: 2024-05-06 | End: 2024-05-08 | Stop reason: HOSPADM

## 2024-05-06 RX ORDER — TAMSULOSIN HYDROCHLORIDE 0.4 MG/1
0.4 CAPSULE ORAL
Status: DISCONTINUED | OUTPATIENT
Start: 2024-05-06 | End: 2024-05-08 | Stop reason: HOSPADM

## 2024-05-06 RX ORDER — CITALOPRAM 20 MG/1
20 TABLET ORAL DAILY
COMMUNITY
Start: 2023-11-16

## 2024-05-06 RX ORDER — FAMOTIDINE 20 MG/1
20 TABLET, FILM COATED ORAL
Status: DISCONTINUED | OUTPATIENT
Start: 2024-05-06 | End: 2024-05-08 | Stop reason: HOSPADM

## 2024-05-06 RX ORDER — CITALOPRAM 20 MG/1
20 TABLET ORAL DAILY
Status: DISCONTINUED | OUTPATIENT
Start: 2024-05-06 | End: 2024-05-08 | Stop reason: HOSPADM

## 2024-05-06 RX ORDER — INSULIN GLARGINE 100 [IU]/ML
30 INJECTION, SOLUTION SUBCUTANEOUS EVERY MORNING
Status: DISCONTINUED | OUTPATIENT
Start: 2024-05-06 | End: 2024-05-08 | Stop reason: HOSPADM

## 2024-05-06 RX ORDER — INSULIN GLARGINE 100 [IU]/ML
30 INJECTION, SOLUTION SUBCUTANEOUS DAILY
COMMUNITY
Start: 2024-03-21

## 2024-05-06 RX ORDER — TAMSULOSIN HYDROCHLORIDE 0.4 MG/1
0.4 CAPSULE ORAL ONCE
Status: COMPLETED | OUTPATIENT
Start: 2024-05-06 | End: 2024-05-06

## 2024-05-06 RX ORDER — OXYCODONE HYDROCHLORIDE 10 MG/1
10 TABLET ORAL EVERY 4 HOURS PRN
Status: DISCONTINUED | OUTPATIENT
Start: 2024-05-06 | End: 2024-05-08 | Stop reason: HOSPADM

## 2024-05-06 RX ORDER — DOCUSATE SODIUM 100 MG/1
100 CAPSULE, LIQUID FILLED ORAL 2 TIMES DAILY
Status: DISCONTINUED | OUTPATIENT
Start: 2024-05-06 | End: 2024-05-08 | Stop reason: HOSPADM

## 2024-05-06 RX ORDER — ACETAMINOPHEN 325 MG/1
650 TABLET ORAL EVERY 4 HOURS PRN
Status: DISCONTINUED | OUTPATIENT
Start: 2024-05-06 | End: 2024-05-08 | Stop reason: HOSPADM

## 2024-05-06 RX ORDER — FENTANYL CITRATE 50 UG/ML
50 INJECTION, SOLUTION INTRAMUSCULAR; INTRAVENOUS ONCE
Status: COMPLETED | OUTPATIENT
Start: 2024-05-06 | End: 2024-05-06

## 2024-05-06 RX ORDER — ALBUTEROL SULFATE 90 UG/1
2 AEROSOL, METERED RESPIRATORY (INHALATION) EVERY 6 HOURS PRN
Status: DISCONTINUED | OUTPATIENT
Start: 2024-05-06 | End: 2024-05-08 | Stop reason: HOSPADM

## 2024-05-06 RX ORDER — MORPHINE SULFATE 4 MG/ML
4 INJECTION, SOLUTION INTRAMUSCULAR; INTRAVENOUS EVERY 4 HOURS PRN
Status: DISCONTINUED | OUTPATIENT
Start: 2024-05-06 | End: 2024-05-08 | Stop reason: HOSPADM

## 2024-05-06 RX ORDER — KETOROLAC TROMETHAMINE 30 MG/ML
15 INJECTION, SOLUTION INTRAMUSCULAR; INTRAVENOUS EVERY 6 HOURS SCHEDULED
Status: DISCONTINUED | OUTPATIENT
Start: 2024-05-06 | End: 2024-05-08 | Stop reason: HOSPADM

## 2024-05-06 RX ORDER — NIFEDIPINE 60 MG/1
60 TABLET, EXTENDED RELEASE ORAL DAILY
Status: DISCONTINUED | OUTPATIENT
Start: 2024-05-06 | End: 2024-05-06 | Stop reason: CLARIF

## 2024-05-06 RX ORDER — NIFEDIPINE 60 MG/1
60 TABLET, EXTENDED RELEASE ORAL DAILY
COMMUNITY
Start: 2024-02-05

## 2024-05-06 RX ORDER — SODIUM CHLORIDE 9 MG/ML
150 INJECTION, SOLUTION INTRAVENOUS CONTINUOUS
Status: DISCONTINUED | OUTPATIENT
Start: 2024-05-06 | End: 2024-05-08 | Stop reason: HOSPADM

## 2024-05-06 RX ORDER — MORPHINE SULFATE 4 MG/ML
4 INJECTION, SOLUTION INTRAMUSCULAR; INTRAVENOUS ONCE
Status: COMPLETED | OUTPATIENT
Start: 2024-05-06 | End: 2024-05-06

## 2024-05-06 RX ADMIN — BUSPIRONE HYDROCHLORIDE 15 MG: 10 TABLET ORAL at 08:17

## 2024-05-06 RX ADMIN — OXYCODONE HYDROCHLORIDE 10 MG: 10 TABLET ORAL at 04:45

## 2024-05-06 RX ADMIN — ONDANSETRON 4 MG: 2 INJECTION INTRAMUSCULAR; INTRAVENOUS at 00:12

## 2024-05-06 RX ADMIN — DOXEPIN HYDROCHLORIDE 75 MG: 25 CAPSULE ORAL at 22:48

## 2024-05-06 RX ADMIN — MORPHINE SULFATE 4 MG: 4 INJECTION, SOLUTION INTRAMUSCULAR; INTRAVENOUS at 00:44

## 2024-05-06 RX ADMIN — FAMOTIDINE 20 MG: 20 TABLET ORAL at 22:48

## 2024-05-06 RX ADMIN — FENTANYL CITRATE 50 MCG: 50 INJECTION, SOLUTION INTRAMUSCULAR; INTRAVENOUS at 02:30

## 2024-05-06 RX ADMIN — INSULIN GLARGINE 30 UNITS: 100 INJECTION, SOLUTION SUBCUTANEOUS at 08:18

## 2024-05-06 RX ADMIN — DOCUSATE SODIUM 100 MG: 100 CAPSULE, LIQUID FILLED ORAL at 08:18

## 2024-05-06 RX ADMIN — SODIUM CHLORIDE 1000 ML: 0.9 INJECTION, SOLUTION INTRAVENOUS at 00:10

## 2024-05-06 RX ADMIN — OXYCODONE HYDROCHLORIDE 10 MG: 10 TABLET ORAL at 14:06

## 2024-05-06 RX ADMIN — KETOROLAC TROMETHAMINE 15 MG: 30 INJECTION, SOLUTION INTRAMUSCULAR; INTRAVENOUS at 11:54

## 2024-05-06 RX ADMIN — INSULIN LISPRO 5 UNITS: 100 INJECTION, SOLUTION INTRAVENOUS; SUBCUTANEOUS at 08:10

## 2024-05-06 RX ADMIN — TAMSULOSIN HYDROCHLORIDE 0.4 MG: 0.4 CAPSULE ORAL at 16:55

## 2024-05-06 RX ADMIN — SODIUM CHLORIDE 150 ML/HR: 0.9 INJECTION, SOLUTION INTRAVENOUS at 21:08

## 2024-05-06 RX ADMIN — BUSPIRONE HYDROCHLORIDE 15 MG: 10 TABLET ORAL at 17:27

## 2024-05-06 RX ADMIN — NIFEDIPINE 60 MG: 30 TABLET, FILM COATED, EXTENDED RELEASE ORAL at 08:17

## 2024-05-06 RX ADMIN — KETOROLAC TROMETHAMINE 15 MG: 30 INJECTION, SOLUTION INTRAMUSCULAR; INTRAVENOUS at 00:12

## 2024-05-06 RX ADMIN — DOCUSATE SODIUM 100 MG: 100 CAPSULE, LIQUID FILLED ORAL at 17:27

## 2024-05-06 RX ADMIN — KETOROLAC TROMETHAMINE 15 MG: 30 INJECTION, SOLUTION INTRAMUSCULAR; INTRAVENOUS at 05:30

## 2024-05-06 RX ADMIN — MORPHINE SULFATE 4 MG: 4 INJECTION, SOLUTION INTRAMUSCULAR; INTRAVENOUS at 08:29

## 2024-05-06 RX ADMIN — OXYCODONE HYDROCHLORIDE 10 MG: 10 TABLET ORAL at 21:00

## 2024-05-06 RX ADMIN — KETOROLAC TROMETHAMINE 15 MG: 30 INJECTION, SOLUTION INTRAMUSCULAR; INTRAVENOUS at 23:36

## 2024-05-06 RX ADMIN — CITALOPRAM HYDROBROMIDE 20 MG: 20 TABLET ORAL at 08:17

## 2024-05-06 RX ADMIN — SODIUM CHLORIDE 150 ML/HR: 0.9 INJECTION, SOLUTION INTRAVENOUS at 03:45

## 2024-05-06 RX ADMIN — SODIUM CHLORIDE 150 ML/HR: 0.9 INJECTION, SOLUTION INTRAVENOUS at 11:46

## 2024-05-06 RX ADMIN — TAMSULOSIN HYDROCHLORIDE 0.4 MG: 0.4 CAPSULE ORAL at 02:30

## 2024-05-06 RX ADMIN — KETOROLAC TROMETHAMINE 15 MG: 30 INJECTION, SOLUTION INTRAMUSCULAR; INTRAVENOUS at 17:26

## 2024-05-06 NOTE — ASSESSMENT & PLAN NOTE
"Lab Results   Component Value Date    HGBA1C 12.3 (H) 10/13/2023       No results for input(s): \"POCGLU\" in the last 72 hours.    Blood Sugar Average: Last 72 hrs:    Poorly controlled diabetes, w/ A1c of 12.3  Meal coverage and SSI coverage with accuchecks  Hold trulicity  "

## 2024-05-06 NOTE — H&P
"[facility]  H&P  Name: Sha Muñoz 28 y.o. female I MRN: [id number]  Unit/Bed#: ED 06 I Date of Admission: 5/5/2024   Date of Service: 5/6/2024 I Hospital Day: 0      Assessment/Plan   * Calculus of ureterovesical junction (UVJ)  Assessment & Plan  Patient with flank pain for 2 days, hx of nephrolithiasis  CT: 6 mm calculus at the right ureterovesical junction causing moderate hydronephrosis and moderate to severe hydroureter. Multiple nonobstructing bilateral renal calculi.   ED reviewed with Urology, recommendations noted  Pain control      Type 2 diabetes mellitus without complication, with long-term current use of insulin ([facility])  Assessment & Plan  Lab Results   Component Value Date    HGBA1C 12.3 (H) 10/13/2023       No results for input(s): \"POCGLU\" in the last 72 hours.    Blood Sugar Average: Last 72 hrs:    Poorly controlled diabetes, w/ A1c of 12.3  Meal coverage and SSI coverage with accuchecks  Hold trulicity    Hypertension, essential  Assessment & Plan  Continue home medications with hold parameters    Class 3 severe obesity due to excess calories without serious comorbidity with body mass index (BMI) of 50.0 to 59.9 in adult ([facility])  Assessment & Plan  BMI 50  Healthy diet and lifestyle modification recommended       VTE Pharmacologic Prophylaxis: VTE Score: 2 Low Risk (Score 0-2) - Encourage Ambulation.  Code Status: Level 1 - Full Code   Discussion with patient    Anticipated Length of Stay: Patient will be admitted on an observation basis with an anticipated length of stay of less than 2 midnights secondary to IVF, pain control.    Total Time Spent on Date of Encounter in care of patient: 35 mins. This time was spent on one or more of the following: performing physical exam; counseling and coordination of care; obtaining or reviewing history; documenting in the medical record; reviewing/ordering tests, medications or procedures; communicating with other " healthcare professionals and discussing with patient's family/caregivers.    Chief Complaint: flank pain    History of Present Illness:  Sha Muñoz is a 28 y.o. female with a PMH of Asthma, Morbid obesity, HTN, HLD, DM2 not on insulin who presents with flank pain. Patient presented to the ED secondary to 2 days of right-sided flank pain. CT with 6 mm calculus at right UVJ w/ moderate hydronephrosis. ED reviewed w/ Urology and recommended aggressive IVF, antiemetics, pain medication, Flomax and strain urine. If patient becomes hemodynamically unstable/febrile to review w/ Urology for transfer.    Review of Systems:  Review of Systems   Constitutional:  Negative for chills and fever.   HENT:  Negative for ear pain and sore throat.    Eyes:  Negative for pain and visual disturbance.   Respiratory:  Negative for cough and shortness of breath.    Cardiovascular:  Negative for chest pain and palpitations.   Gastrointestinal:  Negative for abdominal pain and vomiting.   Genitourinary:  Positive for flank pain. Negative for dysuria and hematuria.   Musculoskeletal:  Negative for arthralgias and back pain.   Skin:  Negative for color change and rash.   Neurological:  Negative for seizures and syncope.   All other systems reviewed and are negative.      Past Medical and Surgical History:   Past Medical History:   Diagnosis Date    Abnormal CT of the head     Analgesic overuse headache     Asthma     Chronic pain of both shoulders     Diabetes mellitus (HCC)     Hyperlipidemia     Hypertension     Obesity, morbid, BMI 50 or higher (HCC)     Urethral diverticulum        Past Surgical History:   Procedure Laterality Date     SECTION      x4    DILATION AND EVACUATION  2015    TUBAL LIGATION         Meds/Allergies:  Prior to Admission medications    Medication Sig Start Date End Date Taking? Authorizing Provider   acetaminophen (TYLENOL) 500 mg tablet Take 1 tablet (500 mg total) by mouth every 6 (six)  hours as needed for mild pain or moderate pain 11/21/22   Yanira Reyes PA-C   acetaZOLAMIDE (DIAMOX) 250 mg tablet 1 tablet at night for 1 week then 1 tablet twice daily for 1 week then 1 tablet in the morning 2 tablets at night for 1 week then 2 tablets twice daily and continue 9/8/22   Historical Provider, MD   albuterol (2.5 mg/3 mL) 0.083 % nebulizer solution Take 3 mL (2.5 mg total) by nebulization every 6 (six) hours as needed for wheezing or shortness of breath 9/17/22   Juan Carlos Treviño Jr., PA-C   albuterol (ProAir HFA) 90 mcg/act inhaler Inhale 2 puffs every 6 (six) hours as needed for wheezing 9/17/22   Juan Carlos Treviño Jr., PA-C   benzonatate (TESSALON PERLES) 100 mg capsule Take 1 capsule (100 mg total) by mouth 3 (three) times a day as needed for cough 9/17/22   Juan Carlos Treviño Jr., PA-C   busPIRone (BUSPAR) 10 mg tablet  8/18/22   Historical Provider, MD   butalbital-acetaminophen-caffeine (Esgic) -40 mg per tablet Take 1 tablet by mouth every 4 (four) hours as needed for headaches 11/27/21   Flakito Joiner PA-C   diphenhydrAMINE (SOMINEX) 25 MG tablet Take 25 mg by mouth every 6 (six) hours as needed 9/14/22 10/14/22  Historical Provider, MD   Dulaglutide (Trulicity) 1.5 MG/0.5ML SOPN Inject 1.5 mg under the skin Once a week 9/18/22   Historical Provider, MD   fluticasone (FLONASE) 50 mcg/act nasal spray 1 spray into each nostril daily 11/21/22   Yanira Reyes PA-C   fluticasone (Flovent HFA) 44 mcg/act inhaler Inhale 2 puffs 2 (two) times a day 6/1/22 6/1/23  Historical Provider, MD   guaiFENesin (MUCINEX) 600 mg 12 hr tablet Take 2 tablets (1,200 mg total) by mouth every 12 (twelve) hours 11/21/22   Yanira Reyes PA-C   ibuprofen (MOTRIN) 600 mg tablet Take 1 tablet (600 mg total) by mouth every 6 (six) hours as needed for mild pain or moderate pain 11/21/22   Yanira Reyes PA-C   ibuprofen (MOTRIN) 800 mg tablet Take 800 mg by mouth 2 (two) times a day as needed 6/2/22 6/2/23  Historical  Provider, MD   losartan (COZAAR) 25 mg tablet Take 25 mg by mouth daily 6/1/22 6/1/23  Historical Provider, MD   menthol-cetylpyridinium (CEPACOL) 3 MG lozenge Take 1 lozenge (3 mg total) by mouth as needed for sore throat 11/21/22   Yanira Reyes PA-C   NIFEdipine (PROCARDIA XL) 30 mg 24 hr tablet Take 30 mg by mouth daily 4/13/20 4/13/21  Historical Provider, MD   ondansetron (ZOFRAN) 8 mg tablet Take 8 mg by mouth every 12 (twelve) hours as needed 6/2/22   Historical Provider, MD   prochlorperazine (COMPAZINE) 5 mg tablet Take 5 mg by mouth every 6 (six) hours as needed 9/14/22 9/21/22  Historical Provider, MD   propranolol (INDERAL) 80 mg tablet Take 80 mg by mouth 2 (two) times a day 1/27/20 1/26/21  Historical Provider, MD   rizatriptan (MAXALT) 10 mg tablet Take 10 mg by mouth 6/2/22 6/2/23  Historical Provider, MD WALTON have reviewed home medications with patient personally.    Allergies:   Allergies   Allergen Reactions    Piper Shortness Of Breath     Black pepper    Metformin GI Intolerance    Cheese - Food Allergy Rash and Swelling     Swelling lips      Substance Use History:   Social History     Substance and Sexual Activity   Alcohol Use Yes     Social History     Tobacco Use   Smoking Status Former   Smokeless Tobacco Never     Social History     Substance and Sexual Activity   Drug Use Not Currently    Types: Marijuana       Family History:  Family History   Problem Relation Age of Onset    Hypertension Mother     Heart disease Mother     Diabetes Mother     Asthma Mother     Sleep apnea Father     Heart disease Father     Asthma Father     Heart failure Father     Asthma Sister     Asthma Brother     Asthma Brother     Sleep apnea Brother     Asthma Son     Asthma Son     Asthma Son     Rashes / Skin problems Son     Diabetes Maternal Grandmother     Hypertension Maternal Grandmother     Diabetes Maternal Grandfather     Cancer Maternal Aunt         ovarian    Hypertension Maternal Aunt      "Arthritis Maternal Aunt     Cancer Paternal Aunt         ovarian    Heart disease Paternal Aunt     Diabetes Paternal Aunt        Physical Exam:     Vitals:   Blood Pressure: 143/86 (05/06/24 0300)  Pulse: 63 (05/06/24 0300)  Temperature: 97.5 °F (36.4 °C) (05/05/24 2345)  Temp Source: Tympanic (05/05/24 2345)  Respirations: 18 (05/06/24 0300)  Height: 5' 2\" (157.5 cm) (05/05/24 2345)  Weight - Scale: 124 kg (274 lb 6.4 oz) (05/05/24 2345)  SpO2: 95 % (05/06/24 0300)    Physical Exam  Vitals and nursing note reviewed.   Constitutional:       General: She is not in acute distress.     Appearance: She is well-developed.   HENT:      Head: Normocephalic and atraumatic.   Eyes:      Conjunctiva/sclera: Conjunctivae normal.   Cardiovascular:      Rate and Rhythm: Normal rate and regular rhythm.      Heart sounds: No murmur heard.  Pulmonary:      Effort: Pulmonary effort is normal. No respiratory distress.      Breath sounds: Normal breath sounds.   Abdominal:      Palpations: Abdomen is soft.      Tenderness: There is no abdominal tenderness.   Musculoskeletal:         General: No swelling.      Cervical back: Neck supple.   Skin:     General: Skin is warm and dry.      Capillary Refill: Capillary refill takes less than 2 seconds.   Neurological:      Mental Status: She is alert.   Psychiatric:         Mood and Affect: Mood normal.          Additional Data:     Lab Results:  Results from last 7 days   Lab Units 05/06/24  0009   WBC Thousand/uL 12.25*   HEMOGLOBIN g/dL 11.4*   HEMATOCRIT % 37.5   PLATELETS Thousands/uL 234   SEGS PCT % 74   LYMPHO PCT % 19   MONO PCT % 5   EOS PCT % 1     Results from last 7 days   Lab Units 05/06/24  0009   SODIUM mmol/L 138   POTASSIUM mmol/L 3.6   CHLORIDE mmol/L 106   CO2 mmol/L 23   BUN mg/dL 22   CREATININE mg/dL 0.97   ANION GAP mmol/L 9   CALCIUM mg/dL 9.3   ALBUMIN g/dL 4.2   TOTAL BILIRUBIN mg/dL 0.24   ALK PHOS U/L 94   ALT U/L 56*   AST U/L 26   GLUCOSE RANDOM mg/dL 162*      "  Lines/Drains:  Invasive Devices       Peripheral Intravenous Line  Duration             Peripheral IV 05/06/24 Right Antecubital <1 day                  Imaging: Reviewed radiology reports from this admission including: CT renal stone study   CT renal stone study abdomen pelvis wo contrast   Final Result by Saba Ryan MD (05/06 0152)      6 mm calculus at the right ureterovesical junction causing moderate hydronephrosis and moderate to severe hydroureter.      Multiple nonobstructing bilateral renal calculi.      Hepatomegaly and hepatic steatosis.      The study was marked in EPIC for immediate notification.         Workstation performed: [id number]             EKG and Other Studies Reviewed on Admission:   EKG: No EKG obtained.    ** Please Note: This note has been constructed using a voice recognition system. **

## 2024-05-06 NOTE — QUICK NOTE
Patient is a 28-year-old female past urologic history of nephrolithiasis presenting due to 2 days of right-sided flank pain.    Upon evaluation in the ED patient is afebrile hemodynamically stableNo SHERLYN, leukocytosis of 12.25, UA negative for infection    CT scan revealing 6 mm calculus at right UVJ causing moderate hydronephrosis    Per ED difficult pain control Toradol fentanyl and morphine.    Discussed with ED comfortable with admission to [facility] for medical optimization and medical expulsive therapy.  Recommend IV aggressive IV fluids, antiemetics, pain medication, Flomax, strain all urine.     Would recommend 24 hours of medical expulsive therapy and reevaluation.  If patient continues to have uncontrolled pain requiring multiple narcotic medication or become hemodynamically unstable/febrile would recommend internal medicine team reach out to urology to consider transfer to [facility] for surgical intervention.  Patient may have a diet today.  N.p.o. at midnight pending reevaluation.    If patient improves over the next 24 hours, primary team may discharge at their discretion and patient may follow-up with urology on outpatient .patient is an appropriate candidate for outpatient trial of passage of stone.  As she has a smaller distal stone at the UVJ and labs within normal limits.    Any questions or concerns please reach out to urology team.    Madhuri Dowling PA-C

## 2024-05-06 NOTE — PROGRESS NOTES
Recheck: 28 year old healthy female with 6mm right UVJ stone with hydronephrosis and flank pain for 2 days. Discussed with primary treating team: Pain improved today but not resolved. pt wishes continued analgesia and hdyration with tamsulosin planned, straining urine to see if she passes it spontaneously. Future planning: NPO midnight tonight- If persistently symptomatic tomorrow will arrange operative transfer (location TBD), otherwise she may wish to go home tomorrow if comfortable with oral medications, and followup in the office. All parties aware.

## 2024-05-06 NOTE — ED PROVIDER NOTES
History  Chief Complaint   Patient presents with    Flank Pain     Right sided flank pain started yesterday but got worse today   has a history of kidney stones    states she is voiding frequently   taking tylenol and motrin without relief         28-year-old female with history of nephrolithiasis presents complaining of right flank pain.  Patient states that symptoms feel similar to prior episode with a kidney stone.  Admits to nausea and vomiting.  Tried Tylenol prior to arrival without relief.  Admits to some urinary frequency but denies dysuria or hematuria.  Denies any other complaints.      History provided by:  Patient   used: No        Prior to Admission Medications   Prescriptions Last Dose Informant Patient Reported? Taking?   Dulaglutide (Trulicity) 1.5 MG/0.5ML SOPN   Yes No   Sig: Inject 1.5 mg under the skin Once a week   Lantus SoloStar 100 units/mL SOPN   Yes Yes   Sig: Inject 30 Units under the skin daily   NIFEdipine (PROCARDIA XL) 30 mg 24 hr tablet   Yes No   Sig: Take 30 mg by mouth daily   NIFEdipine (PROCARDIA XL) 60 mg 24 hr tablet   Yes Yes   Sig: Take 60 mg by mouth daily   acetaminophen (TYLENOL) 500 mg tablet   No No   Sig: Take 1 tablet (500 mg total) by mouth every 6 (six) hours as needed for mild pain or moderate pain   albuterol (ProAir HFA) 90 mcg/act inhaler   No No   Sig: Inhale 2 puffs every 6 (six) hours as needed for wheezing   busPIRone (BUSPAR) 15 mg tablet   Yes Yes   Sig: Take 15 mg by mouth 2 (two) times a day   butalbital-acetaminophen-caffeine (Esgic) -40 mg per tablet   No No   Sig: Take 1 tablet by mouth every 4 (four) hours as needed for headaches   citalopram (CeleXA) 20 mg tablet   Yes Yes   Sig: Take 20 mg by mouth daily   diphenhydrAMINE (SOMINEX) 25 MG tablet   Yes No   Sig: Take 25 mg by mouth every 6 (six) hours as needed   doxepin (SINEquan) 75 MG capsule   Yes Yes   Sig: Take 75 mg by mouth daily at bedtime   famotidine (PEPCID) 20 mg  tablet   Yes Yes   Sig: Take 20 mg by mouth daily at bedtime   fluticasone (FLONASE) 50 mcg/act nasal spray   No No   Si spray into each nostril daily   fluticasone (Flovent HFA) 44 mcg/act inhaler   Yes No   Sig: Inhale 2 puffs 2 (two) times a day   ibuprofen (MOTRIN) 800 mg tablet   Yes No   Sig: Take 800 mg by mouth 2 (two) times a day as needed   losartan (COZAAR) 25 mg tablet   Yes No   Sig: Take 25 mg by mouth daily   ondansetron (ZOFRAN) 8 mg tablet   Yes No   Sig: Take 8 mg by mouth every 12 (twelve) hours as needed   prochlorperazine (COMPAZINE) 5 mg tablet   Yes No   Sig: Take 5 mg by mouth every 6 (six) hours as needed   propranolol (INDERAL) 80 mg tablet   Yes No   Sig: Take 80 mg by mouth 2 (two) times a day   rizatriptan (MAXALT) 10 mg tablet   Yes No   Sig: Take 10 mg by mouth      Facility-Administered Medications: None       Past Medical History:   Diagnosis Date    Abnormal CT of the head     Analgesic overuse headache     Asthma     Chronic pain of both shoulders     Diabetes mellitus (HCC)     Hyperlipidemia     Hypertension     Obesity, morbid, BMI 50 or higher (HCC)     Urethral diverticulum        Past Surgical History:   Procedure Laterality Date     SECTION      x4    DILATION AND EVACUATION  2015    TUBAL LIGATION         Family History   Problem Relation Age of Onset    Hypertension Mother     Heart disease Mother     Diabetes Mother     Asthma Mother     Sleep apnea Father     Heart disease Father     Asthma Father     Heart failure Father     Asthma Sister     Asthma Brother     Asthma Brother     Sleep apnea Brother     Asthma Son     Asthma Son     Asthma Son     Rashes / Skin problems Son     Diabetes Maternal Grandmother     Hypertension Maternal Grandmother     Diabetes Maternal Grandfather     Cancer Maternal Aunt         ovarian    Hypertension Maternal Aunt     Arthritis Maternal Aunt     Cancer Paternal Aunt         ovarian    Heart disease Paternal Aunt      Diabetes Paternal Aunt      I have reviewed and agree with the history as documented.    E-Cigarette/Vaping    E-Cigarette Use Former User      E-Cigarette/Vaping Substances    Nicotine No     THC No     CBD No     Flavoring No     Other No     Unknown No      Social History     Tobacco Use    Smoking status: Former    Smokeless tobacco: Never   Vaping Use    Vaping status: Former   Substance Use Topics    Alcohol use: Yes    Drug use: Not Currently     Types: Marijuana       Review of Systems   Constitutional: Negative.  Negative for chills and fatigue.   HENT:  Negative for ear pain and sore throat.    Eyes:  Negative for photophobia and redness.   Respiratory:  Negative for apnea, cough and shortness of breath.    Cardiovascular:  Negative for chest pain.   Gastrointestinal:  Negative for abdominal pain, nausea and vomiting.   Genitourinary:  Positive for flank pain. Negative for dysuria.   Musculoskeletal:  Negative for arthralgias, neck pain and neck stiffness.   Skin:  Negative for rash.   Neurological:  Negative for dizziness, tremors, syncope and weakness.   Psychiatric/Behavioral:  Negative for suicidal ideas.        Physical Exam  Physical Exam  Constitutional:       General: She is not in acute distress.     Appearance: She is well-developed. She is not diaphoretic.      Comments: Writhing in pain   Eyes:      Pupils: Pupils are equal, round, and reactive to light.   Cardiovascular:      Rate and Rhythm: Normal rate and regular rhythm.   Pulmonary:      Effort: Pulmonary effort is normal. No respiratory distress.      Breath sounds: Normal breath sounds.   Abdominal:      General: Bowel sounds are normal. There is no distension.      Palpations: Abdomen is soft.   Musculoskeletal:         General: Normal range of motion.      Cervical back: Normal range of motion and neck supple.   Skin:     General: Skin is warm and dry.   Neurological:      Mental Status: She is alert and oriented to person, place, and  time.         Vital Signs  ED Triage Vitals [05/05/24 2345]   Temperature Pulse Respirations Blood Pressure SpO2   97.5 °F (36.4 °C) 76 18 (!) 184/98 95 %      Temp Source Heart Rate Source Patient Position - Orthostatic VS BP Location FiO2 (%)   Tympanic Monitor Lying Left arm --      Pain Score       10 - Worst Possible Pain           Vitals:    05/06/24 0036 05/06/24 0100 05/06/24 0200 05/06/24 0300   BP: 146/84 145/87 131/80 143/86   Pulse: 69 70 68 63   Patient Position - Orthostatic VS: Lying Lying Lying Lying         Visual Acuity      ED Medications  Medications   ketorolac (TORADOL) injection 15 mg (has no administration in time range)   sodium chloride 0.9 % infusion (150 mL/hr Intravenous New Bag 5/6/24 0345)   tamsulosin (FLOMAX) capsule 0.4 mg (has no administration in time range)   acetaminophen (TYLENOL) tablet 650 mg (has no administration in time range)   ondansetron (ZOFRAN) injection 4 mg (has no administration in time range)   oxyCODONE (ROXICODONE) IR tablet 5 mg (has no administration in time range)   oxyCODONE (ROXICODONE) IR tablet 10 mg (has no administration in time range)   morphine injection 4 mg (has no administration in time range)   docusate sodium (COLACE) capsule 100 mg (has no administration in time range)   insulin lispro (HumALOG/ADMELOG) 100 units/mL subcutaneous injection 5 Units (has no administration in time range)   insulin lispro (HumALOG/ADMELOG) 100 units/mL subcutaneous injection 2-12 Units (has no administration in time range)   insulin lispro (HumALOG/ADMELOG) 100 units/mL subcutaneous injection 1-6 Units (has no administration in time range)   sodium chloride 0.9 % bolus 1,000 mL (0 mL Intravenous Stopped 5/6/24 0235)   ketorolac (TORADOL) injection 15 mg (15 mg Intravenous Given 5/6/24 0012)   ondansetron (ZOFRAN) injection 4 mg (4 mg Intravenous Given 5/6/24 0012)   morphine injection 4 mg (4 mg Intravenous Given 5/6/24 0044)   fentaNYL injection 50 mcg (50 mcg  Intravenous Given 5/6/24 0230)   tamsulosin (FLOMAX) capsule 0.4 mg (0.4 mg Oral Given 5/6/24 0230)       Diagnostic Studies  Results Reviewed       Procedure Component Value Units Date/Time    Hemoglobin A1C [[id number]]     Lab Status: No result Specimen: Blood     Comprehensive metabolic panel [[id number]]  (Abnormal) Collected: 05/06/24 0009    Lab Status: Final result Specimen: Blood from Arm, Right Updated: 05/06/24 0034     Sodium 138 mmol/L      Potassium 3.6 mmol/L      Chloride 106 mmol/L      CO2 23 mmol/L      ANION GAP 9 mmol/L      BUN 22 mg/dL      Creatinine 0.97 mg/dL      Glucose 162 mg/dL      Calcium 9.3 mg/dL      AST 26 U/L      ALT 56 U/L      Alkaline Phosphatase 94 U/L      Total Protein 7.9 g/dL      Albumin 4.2 g/dL      Total Bilirubin 0.24 mg/dL      eGFR 79 ml/min/1.73sq m     Narrative:      National Kidney Disease Foundation guidelines for Chronic Kidney Disease (CKD):     Stage 1 with normal or high GFR (GFR > 90 mL/min/1.73 square meters)    Stage 2 Mild CKD (GFR = 60-89 mL/min/1.73 square meters)    Stage 3A Moderate CKD (GFR = 45-59 mL/min/1.73 square meters)    Stage 3B Moderate CKD (GFR = 30-44 mL/min/1.73 square meters)    Stage 4 Severe CKD (GFR = 15-29 mL/min/1.73 square meters)    Stage 5 End Stage CKD (GFR <15 mL/min/1.73 square meters)  Note: GFR calculation is accurate only with a steady state creatinine    Lipase [[id number]]  (Normal) Collected: 05/06/24 0009    Lab Status: Final result Specimen: Blood from Arm, Right Updated: 05/06/24 0034     Lipase 71 u/L     Urine Microscopic [[id number]]  (Abnormal) Collected: 05/06/24 0009    Lab Status: Final result Specimen: Urine, Clean Catch Updated: 05/06/24 0021     RBC, UA 20-30 /hpf      WBC, UA None Seen /hpf      Epithelial Cells Occasional /hpf      Bacteria, UA Occasional /hpf     UA (URINE) with reflex to Scope [[id number]]  (Abnormal) Collected: 05/06/24 0009    Lab Status: Final result Specimen: Urine, Clean Catch  Updated: 05/06/24 0021     Color, UA Straw     Clarity, UA Clear     Specific Gravity, UA 1.010     pH, UA 7.0     Leukocytes, UA Negative     Nitrite, UA Negative     Protein, UA Negative mg/dl      Glucose, UA Negative mg/dl      Ketones, UA Negative mg/dl      Bilirubin, UA Negative     Occult Blood, .0     UROBILINOGEN UA Negative mg/dL     CBC and differential [[id number]]  (Abnormal) Collected: 05/06/24 0009    Lab Status: Final result Specimen: Blood from Arm, Right Updated: 05/06/24 0015     WBC 12.25 Thousand/uL      RBC 4.11 Million/uL      Hemoglobin 11.4 g/dL      Hematocrit 37.5 %      MCV 91 fL      MCH 27.7 pg      MCHC 30.4 g/dL      RDW 13.4 %      MPV 11.0 fL      Platelets 234 Thousands/uL      nRBC 0 /100 WBCs      Segmented % 74 %      Immature Grans % 1 %      Lymphocytes % 19 %      Monocytes % 5 %      Eosinophils Relative 1 %      Basophils Relative 0 %      Absolute Neutrophils 9.08 Thousands/µL      Absolute Immature Grans 0.06 Thousand/uL      Absolute Lymphocytes 2.37 Thousands/µL      Absolute Monocytes 0.55 Thousand/µL      Eosinophils Absolute 0.17 Thousand/µL      Basophils Absolute 0.02 Thousands/µL     POCT pregnancy, urine [[id number]]  (Normal) Resulted: 05/06/24 0011    Lab Status: Final result Updated: 05/06/24 0012     EXT Preg Test, Ur Negative     Control Valid                   CT renal stone study abdomen pelvis wo contrast   Final Result by Saba Ryan MD (05/06 0152)      6 mm calculus at the right ureterovesical junction causing moderate hydronephrosis and moderate to severe hydroureter.      Multiple nonobstructing bilateral renal calculi.      Hepatomegaly and hepatic steatosis.      The study was marked in EPIC for immediate notification.         Workstation performed: [id number]                    Procedures  Procedures         ED Course                               SBIRT 20yo+      Flowsheet Row Most Recent Value   Initial Alcohol Screen: US AUDIT-C      1. How often do you have a drink containing alcohol? 0 Filed at: 05/05/2024 2348   2. How many drinks containing alcohol do you have on a typical day you are drinking?  0 Filed at: 05/05/2024 2348   3b. FEMALE Any Age, or MALE 65+: How often do you have 4 or more drinks on one occassion? 0 Filed at: 05/05/2024 2348   Audit-C Score 0 Filed at: 05/05/2024 2348   AYAAN: How many times in the past year have you...    Used an illegal drug or used a prescription medication for non-medical reasons? Never Filed at: 05/05/2024 2348                      Medical Decision Making  Patient presenting complaining of history and physical exam consistent with nephrolithiasis.  History of similar prior.  Patient had CT evidence of 6 mm nephrolithiasis with hydroureter.  No evidence of urinary tract infection.  Did not meet SIRS criteria.  Spoke to urology regarding pain control as I was unable to control pain in the emergency department.  Urology in agreement with plan to admit for intractable pain.  Patient admitted to the floor in stable condition.    Amount and/or Complexity of Data Reviewed  Labs: ordered.  Radiology: ordered.    Risk  Prescription drug management.  Decision regarding hospitalization.             Disposition  Final diagnoses:   Nephrolithiasis   Intractable pain     Time reflects when diagnosis was documented in both MDM as applicable and the Disposition within this note       Time User Action Codes Description Comment    5/6/2024  3:26 AM Esperanza Narayanan Add [N20.0] Nephrolithiasis     5/6/2024  3:27 AM Esperanza Narayanan Add [R52] Intractable pain           ED Disposition       ED Disposition   Admit    Condition   Stable    Date/Time   Mon May 6, 2024 0326    Comment   Case was discussed with SLIM PA and the patient's admission status was agreed to be Admission Status: observation status to the service of Dr. Rose .               Follow-up Information    None         Patient's Medications   Discharge  Prescriptions    No medications on file       No discharge procedures on file.    PDMP Review       None            ED Provider  Electronically Signed by             Esperanza Narayanan PA-C  05/06/24 5814

## 2024-05-06 NOTE — CASE MANAGEMENT
Case Management Assessment & Discharge Planning Note    Patient name Sha Muñoz  Location 7T [facility] 710/7T [facility] 710-01 MRN [id number]  : 1995 Date 2024       Current Admission Date: 2024  Current Admission Diagnosis:Calculus of ureterovesical junction (UVJ)   Patient Active Problem List    Diagnosis Date Noted    Calculus of ureterovesical junction (UVJ) 2024    Type 2 diabetes mellitus without complication, with long-term current use of insulin ([facility]) 2024    GERD (gastroesophageal reflux disease) 2022    Class 3 severe obesity due to excess calories without serious comorbidity with body mass index (BMI) of 50.0 to 59.9 in adult ([facility]) 2019    Hypertension, essential 2019    Asthma 2013      LOS (days): 0  Geometric Mean LOS (GMLOS) (days):   Days to GMLOS:     OBJECTIVE:        Current admission status: Observation    Preferred Pharmacy:   [facility] [id number] [address] [address]  Phone: [phone] Fax: [phone]    Primary Care Provider: [facility]    Primary Insurance: [employer] [facility]  Secondary Insurance:     ASSESSMENT:  Active Health Care Proxies    There are no active Health Care Proxies on file.       Readmission Root Cause  30 Day Readmission: No    Patient Information  Admitted from:: Home  Mental Status: Alert  During Assessment patient was accompanied by: Not accompanied during assessment  Assessment information provided by:: Patient  Primary Caregiver: Self  Support Systems: Spouse/significant other, Self, Family members  County of Residence: [place]  What city do you live in?: [place]  Home entry access options. Select all that apply.: Stairs  Number of steps to enter home.: 4  Do the steps have railings?: Yes  Type of Current Residence: [address] [place] home  Upon entering residence, is there a bedroom on the main floor (no further steps)?: No  A  bedroom is located on the following floor levels of residence (select all that apply):: 2nd Floor  Upon entering residence, is there a bathroom on the main floor (no further steps)?: No  Indicate which floors of current residence have a bathroom (select all the apply):: 2nd Floor  Number of steps to 2nd floor from main floor: One Flight  Living Arrangements: Lives w/ Spouse/significant other  Is patient a ?: No    Activities of Daily Living Prior to Admission  Functional Status: Independent  Completes ADLs independently?: Yes  Ambulates independently?: Yes  Does patient use assisted devices?: No  Does patient currently own DME?: No  Does patient have a history of Outpatient Therapy (PT/OT)?: No  Does the patient have a history of Short-Term Rehab?: No  Does patient have a history of HHC?: No  Does patient currently have HHC?: No      Patient Information Continued  Income Source: Unknown  Does patient have prescription coverage?: Yes  Does patient receive dialysis treatments?: No  Does patient have a history of substance abuse?: No  Does patient have a history of Mental Health Diagnosis?: No      Means of Transportation  Means of Transport to Appts:: Family transport      Social Determinants of Health (SDOH)      Flowsheet Row Most Recent Value   Housing Stability    In the last 12 months, was there a time when you were not able to pay the mortgage or rent on time? N   In the last 12 months, how many places have you lived? 1   In the last 12 months, was there a time when you did not have a steady place to sleep or slept in a shelter (including now)? N   Transportation Needs    In the past 12 months, has lack of transportation kept you from medical appointments or from getting medications? no   In the past 12 months, has lack of transportation kept you from meetings, work, or from getting things needed for daily living? No   Food Insecurity    Within the past 12 months, you worried that your food would run out  before you got the money to buy more. Never true   Within the past 12 months, the food you bought just didn't last and you didn't have money to get more. Never true   Utilities    In the past 12 months has the electric, gas, oil, or water company threatened to shut off services in your home? No            DISCHARGE DETAILS:    Discharge planning discussed with:: Patient  Freedom of Choice: Yes     CM contacted family/caregiver?: No- see comments (AAOx3)       Additional Comments: Met with patient at bedside.  IPTA.  CM department following thru discharge

## 2024-05-06 NOTE — ASSESSMENT & PLAN NOTE
Patient with flank pain for 2 days, hx of nephrolithiasis  CT: 6 mm calculus at the right ureterovesical junction causing moderate hydronephrosis and moderate to severe hydroureter. Multiple nonobstructing bilateral renal calculi.   ED reviewed with Urology, recommendations noted  Pain control

## 2024-05-07 ENCOUNTER — HOSPITAL ENCOUNTER (OUTPATIENT)
Facility: HOSPITAL | Age: 29
Setting detail: SURGERY ADMIT
End: 2024-05-07
Attending: UROLOGY | Admitting: UROLOGY
Payer: MEDICARE

## 2024-05-07 VITALS
HEIGHT: 62 IN | BODY MASS INDEX: 50.23 KG/M2 | TEMPERATURE: 98.7 F | DIASTOLIC BLOOD PRESSURE: 87 MMHG | SYSTOLIC BLOOD PRESSURE: 142 MMHG | RESPIRATION RATE: 16 BRPM | WEIGHT: 272.93 LBS | OXYGEN SATURATION: 97 % | HEART RATE: 97 BPM

## 2024-05-07 LAB
ANION GAP SERPL CALCULATED.3IONS-SCNC: 8 MMOL/L (ref 4–13)
BASOPHILS # BLD AUTO: 0.02 THOUSANDS/ÂΜL (ref 0–0.1)
BASOPHILS NFR BLD AUTO: 0 % (ref 0–1)
BUN SERPL-MCNC: 20 MG/DL (ref 5–25)
CALCIUM SERPL-MCNC: 8.2 MG/DL (ref 8.4–10.2)
CHLORIDE SERPL-SCNC: 110 MMOL/L (ref 96–108)
CO2 SERPL-SCNC: 21 MMOL/L (ref 21–32)
CREAT SERPL-MCNC: 1.08 MG/DL (ref 0.6–1.3)
EOSINOPHIL # BLD AUTO: 0.27 THOUSAND/ÂΜL (ref 0–0.61)
EOSINOPHIL NFR BLD AUTO: 3 % (ref 0–6)
ERYTHROCYTE [DISTWIDTH] IN BLOOD BY AUTOMATED COUNT: 13.9 % (ref 11.6–15.1)
EST. AVERAGE GLUCOSE BLD GHB EST-MCNC: 148 MG/DL
GFR SERPL CREATININE-BSD FRML MDRD: 70 ML/MIN/1.73SQ M
GLUCOSE P FAST SERPL-MCNC: 85 MG/DL (ref 65–99)
GLUCOSE SERPL-MCNC: 101 MG/DL (ref 65–140)
GLUCOSE SERPL-MCNC: 141 MG/DL (ref 65–140)
GLUCOSE SERPL-MCNC: 85 MG/DL (ref 65–140)
GLUCOSE SERPL-MCNC: 88 MG/DL (ref 65–140)
GLUCOSE SERPL-MCNC: 88 MG/DL (ref 65–140)
GLUCOSE SERPL-MCNC: 95 MG/DL (ref 65–140)
HBA1C MFR BLD: 6.8 %
HCT VFR BLD AUTO: 33 % (ref 34.8–46.1)
HGB BLD-MCNC: 9.5 G/DL (ref 11.5–15.4)
IMM GRANULOCYTES # BLD AUTO: 0.04 THOUSAND/UL (ref 0–0.2)
IMM GRANULOCYTES NFR BLD AUTO: 0 % (ref 0–2)
LYMPHOCYTES # BLD AUTO: 2.49 THOUSANDS/ÂΜL (ref 0.6–4.47)
LYMPHOCYTES NFR BLD AUTO: 26 % (ref 14–44)
MAGNESIUM SERPL-MCNC: 1.9 MG/DL (ref 1.9–2.7)
MCH RBC QN AUTO: 26.8 PG (ref 26.8–34.3)
MCHC RBC AUTO-ENTMCNC: 28.8 G/DL (ref 31.4–37.4)
MCV RBC AUTO: 93 FL (ref 82–98)
MONOCYTES # BLD AUTO: 0.46 THOUSAND/ÂΜL (ref 0.17–1.22)
MONOCYTES NFR BLD AUTO: 5 % (ref 4–12)
NEUTROPHILS # BLD AUTO: 6.2 THOUSANDS/ÂΜL (ref 1.85–7.62)
NEUTS SEG NFR BLD AUTO: 66 % (ref 43–75)
NRBC BLD AUTO-RTO: 0 /100 WBCS
PHOSPHATE SERPL-MCNC: 4 MG/DL (ref 2.7–4.5)
PLATELET # BLD AUTO: 189 THOUSANDS/UL (ref 149–390)
PMV BLD AUTO: 11.2 FL (ref 8.9–12.7)
POTASSIUM SERPL-SCNC: 3.3 MMOL/L (ref 3.5–5.3)
RBC # BLD AUTO: 3.55 MILLION/UL (ref 3.81–5.12)
SODIUM SERPL-SCNC: 139 MMOL/L (ref 135–147)
WBC # BLD AUTO: 9.48 THOUSAND/UL (ref 4.31–10.16)

## 2024-05-07 PROCEDURE — 99239 HOSP IP/OBS DSCHRG MGMT >30: CPT | Performed by: INTERNAL MEDICINE

## 2024-05-07 PROCEDURE — 84100 ASSAY OF PHOSPHORUS: CPT | Performed by: INTERNAL MEDICINE

## 2024-05-07 PROCEDURE — 82948 REAGENT STRIP/BLOOD GLUCOSE: CPT

## 2024-05-07 PROCEDURE — 80048 BASIC METABOLIC PNL TOTAL CA: CPT | Performed by: INTERNAL MEDICINE

## 2024-05-07 PROCEDURE — 85025 COMPLETE CBC W/AUTO DIFF WBC: CPT | Performed by: INTERNAL MEDICINE

## 2024-05-07 PROCEDURE — 83735 ASSAY OF MAGNESIUM: CPT | Performed by: INTERNAL MEDICINE

## 2024-05-07 RX ORDER — NIFEDIPINE 30 MG/1
60 TABLET, EXTENDED RELEASE ORAL DAILY
Status: CANCELLED | OUTPATIENT
Start: 2024-05-08

## 2024-05-07 RX ORDER — TAMSULOSIN HYDROCHLORIDE 0.4 MG/1
0.4 CAPSULE ORAL
Status: CANCELLED | OUTPATIENT
Start: 2024-05-07

## 2024-05-07 RX ORDER — CITALOPRAM 20 MG/1
20 TABLET ORAL DAILY
Status: CANCELLED | OUTPATIENT
Start: 2024-05-08

## 2024-05-07 RX ORDER — SODIUM CHLORIDE 9 MG/ML
150 INJECTION, SOLUTION INTRAVENOUS CONTINUOUS
Status: CANCELLED | OUTPATIENT
Start: 2024-05-07

## 2024-05-07 RX ORDER — KETOROLAC TROMETHAMINE 30 MG/ML
15 INJECTION, SOLUTION INTRAMUSCULAR; INTRAVENOUS EVERY 6 HOURS SCHEDULED
Status: CANCELLED | OUTPATIENT
Start: 2024-05-07 | End: 2024-05-11

## 2024-05-07 RX ORDER — INSULIN GLARGINE 100 [IU]/ML
30 INJECTION, SOLUTION SUBCUTANEOUS EVERY MORNING
Status: CANCELLED | OUTPATIENT
Start: 2024-05-08

## 2024-05-07 RX ORDER — DIPHENHYDRAMINE HCL 25 MG
25 TABLET ORAL EVERY 6 HOURS PRN
Status: DISCONTINUED | OUTPATIENT
Start: 2024-05-07 | End: 2024-05-08 | Stop reason: HOSPADM

## 2024-05-07 RX ORDER — DOXEPIN HYDROCHLORIDE 25 MG/1
75 CAPSULE ORAL
Status: CANCELLED | OUTPATIENT
Start: 2024-05-07

## 2024-05-07 RX ORDER — DIPHENHYDRAMINE HCL 25 MG
25 TABLET ORAL EVERY 6 HOURS PRN
Status: CANCELLED | OUTPATIENT
Start: 2024-05-07

## 2024-05-07 RX ORDER — OXYCODONE HYDROCHLORIDE 5 MG/1
5 TABLET ORAL EVERY 4 HOURS PRN
Status: CANCELLED | OUTPATIENT
Start: 2024-05-07

## 2024-05-07 RX ORDER — ONDANSETRON 2 MG/ML
4 INJECTION INTRAMUSCULAR; INTRAVENOUS EVERY 6 HOURS PRN
Status: CANCELLED | OUTPATIENT
Start: 2024-05-07

## 2024-05-07 RX ORDER — ALBUTEROL SULFATE 90 UG/1
2 AEROSOL, METERED RESPIRATORY (INHALATION) EVERY 6 HOURS PRN
Status: CANCELLED | OUTPATIENT
Start: 2024-05-07

## 2024-05-07 RX ORDER — ACETAMINOPHEN 325 MG/1
650 TABLET ORAL EVERY 4 HOURS PRN
Status: CANCELLED | OUTPATIENT
Start: 2024-05-07

## 2024-05-07 RX ORDER — INSULIN LISPRO 100 [IU]/ML
1-6 INJECTION, SOLUTION INTRAVENOUS; SUBCUTANEOUS
Status: CANCELLED | OUTPATIENT
Start: 2024-05-07

## 2024-05-07 RX ORDER — INSULIN LISPRO 100 [IU]/ML
2-12 INJECTION, SOLUTION INTRAVENOUS; SUBCUTANEOUS
Status: CANCELLED | OUTPATIENT
Start: 2024-05-07

## 2024-05-07 RX ORDER — MORPHINE SULFATE 4 MG/ML
4 INJECTION, SOLUTION INTRAMUSCULAR; INTRAVENOUS EVERY 4 HOURS PRN
Status: CANCELLED | OUTPATIENT
Start: 2024-05-07

## 2024-05-07 RX ORDER — INSULIN LISPRO 100 [IU]/ML
5 INJECTION, SOLUTION INTRAVENOUS; SUBCUTANEOUS
Status: CANCELLED | OUTPATIENT
Start: 2024-05-07

## 2024-05-07 RX ORDER — FAMOTIDINE 20 MG/1
20 TABLET, FILM COATED ORAL
Status: CANCELLED | OUTPATIENT
Start: 2024-05-07 | End: 2025-02-04

## 2024-05-07 RX ORDER — POTASSIUM CHLORIDE 20 MEQ/1
40 TABLET, EXTENDED RELEASE ORAL ONCE
Status: COMPLETED | OUTPATIENT
Start: 2024-05-07 | End: 2024-05-07

## 2024-05-07 RX ORDER — DOCUSATE SODIUM 100 MG/1
100 CAPSULE, LIQUID FILLED ORAL 2 TIMES DAILY
Status: CANCELLED | OUTPATIENT
Start: 2024-05-07

## 2024-05-07 RX ORDER — OXYCODONE HYDROCHLORIDE 10 MG/1
10 TABLET ORAL EVERY 4 HOURS PRN
Status: CANCELLED | OUTPATIENT
Start: 2024-05-07

## 2024-05-07 RX ADMIN — INSULIN LISPRO 5 UNITS: 100 INJECTION, SOLUTION INTRAVENOUS; SUBCUTANEOUS at 11:10

## 2024-05-07 RX ADMIN — INSULIN GLARGINE 30 UNITS: 100 INJECTION, SOLUTION SUBCUTANEOUS at 08:10

## 2024-05-07 RX ADMIN — ACETAMINOPHEN 650 MG: 325 TABLET ORAL at 09:39

## 2024-05-07 RX ADMIN — DOCUSATE SODIUM 100 MG: 100 CAPSULE, LIQUID FILLED ORAL at 08:09

## 2024-05-07 RX ADMIN — KETOROLAC TROMETHAMINE 15 MG: 30 INJECTION, SOLUTION INTRAMUSCULAR; INTRAVENOUS at 05:15

## 2024-05-07 RX ADMIN — DIPHENHYDRAMINE HYDROCHLORIDE 25 MG: 25 TABLET ORAL at 01:24

## 2024-05-07 RX ADMIN — KETOROLAC TROMETHAMINE 15 MG: 30 INJECTION, SOLUTION INTRAMUSCULAR; INTRAVENOUS at 17:03

## 2024-05-07 RX ADMIN — SODIUM CHLORIDE 150 ML/HR: 0.9 INJECTION, SOLUTION INTRAVENOUS at 17:10

## 2024-05-07 RX ADMIN — INSULIN LISPRO 5 UNITS: 100 INJECTION, SOLUTION INTRAVENOUS; SUBCUTANEOUS at 08:15

## 2024-05-07 RX ADMIN — OXYCODONE HYDROCHLORIDE 10 MG: 10 TABLET ORAL at 19:58

## 2024-05-07 RX ADMIN — TAMSULOSIN HYDROCHLORIDE 0.4 MG: 0.4 CAPSULE ORAL at 15:46

## 2024-05-07 RX ADMIN — INSULIN LISPRO 5 UNITS: 100 INJECTION, SOLUTION INTRAVENOUS; SUBCUTANEOUS at 16:20

## 2024-05-07 RX ADMIN — OXYCODONE HYDROCHLORIDE 5 MG: 5 TABLET ORAL at 05:16

## 2024-05-07 RX ADMIN — SODIUM CHLORIDE 150 ML/HR: 0.9 INJECTION, SOLUTION INTRAVENOUS at 10:14

## 2024-05-07 RX ADMIN — KETOROLAC TROMETHAMINE 15 MG: 30 INJECTION, SOLUTION INTRAMUSCULAR; INTRAVENOUS at 23:03

## 2024-05-07 RX ADMIN — BUSPIRONE HYDROCHLORIDE 15 MG: 10 TABLET ORAL at 17:02

## 2024-05-07 RX ADMIN — BUSPIRONE HYDROCHLORIDE 15 MG: 10 TABLET ORAL at 08:10

## 2024-05-07 RX ADMIN — POTASSIUM CHLORIDE 40 MEQ: 1500 TABLET, EXTENDED RELEASE ORAL at 09:34

## 2024-05-07 RX ADMIN — DOXEPIN HYDROCHLORIDE 75 MG: 25 CAPSULE ORAL at 21:17

## 2024-05-07 RX ADMIN — OXYCODONE HYDROCHLORIDE 10 MG: 10 TABLET ORAL at 08:11

## 2024-05-07 RX ADMIN — KETOROLAC TROMETHAMINE 15 MG: 30 INJECTION, SOLUTION INTRAMUSCULAR; INTRAVENOUS at 11:09

## 2024-05-07 RX ADMIN — SODIUM CHLORIDE 150 ML/HR: 0.9 INJECTION, SOLUTION INTRAVENOUS at 03:47

## 2024-05-07 RX ADMIN — FAMOTIDINE 20 MG: 20 TABLET ORAL at 21:16

## 2024-05-07 RX ADMIN — DOCUSATE SODIUM 100 MG: 100 CAPSULE, LIQUID FILLED ORAL at 17:02

## 2024-05-07 RX ADMIN — CITALOPRAM HYDROBROMIDE 20 MG: 20 TABLET ORAL at 08:10

## 2024-05-07 NOTE — ASSESSMENT & PLAN NOTE
Patient with reported history of mild intermittent asthma without complication, treated with fluticasone and albuterol rescue inhaler in the outpatient setting. Respiratory status overall stable since admission.    Unremarkable PFT in February 2024  Currently not in exacerbation    Plan  Continue home inhaler regimen  PCP follow up

## 2024-05-07 NOTE — ASSESSMENT & PLAN NOTE
History noted. Also noted to have hepatomegaly and hepatic steatosis on recent CT imaging, as well as significantly elevated BMI.    Plan  Recommend close outpatient follow-up and lifestyle modification on discharge  Consider weight management referral on discharge

## 2024-05-07 NOTE — CASE MANAGEMENT
Case Management Progress Note    Patient name Sha Muñoz  Location 7T [facility] 710/7T [facility] 710-01 MRN [id number]  : 1995 Date 2024       LOS (days): 0  Geometric Mean LOS (GMLOS) (days):   Days to GMLOS:        OBJECTIVE:        Current admission status: Observation  Preferred Pharmacy:   [id number] [address] [address]  Phone: [phone] Fax: [phone]    Primary Care Provider: [facility]    Primary Insurance: [employer] [facility]  Secondary Insurance:     PROGRESS NOTE:    Patient transferring to [facility] higher level of care.   Medical necessity completed and placed in folder on floor.

## 2024-05-07 NOTE — PLAN OF CARE
Problem: PAIN - ADULT  Goal: Verbalizes/displays adequate comfort level or baseline comfort level  Description: Interventions:  - Encourage patient to monitor pain and request assistance  - Assess pain using appropriate pain scale  - Administer analgesics based on type and severity of pain and evaluate response  - Implement non-pharmacological measures as appropriate and evaluate response  - Consider cultural and social influences on pain and pain management  - Notify physician/advanced practitioner if interventions unsuccessful or patient reports new pain  Outcome: Progressing     Problem: DISCHARGE PLANNING  Goal: Discharge to home or other facility with appropriate resources  Description: INTERVENTIONS:  - Identify barriers to discharge w/patient and caregiver  - Arrange for needed discharge resources and transportation as appropriate  - Identify discharge learning needs (meds, wound care, etc.)  - Arrange for interpretive services to assist at discharge as needed  - Refer to Case Management Department for coordinating discharge planning if the patient needs post-hospital services based on physician/advanced practitioner order or complex needs related to functional status, cognitive ability, or social support system  Outcome: Progressing     Problem: METABOLIC, FLUID AND ELECTROLYTES - ADULT  Goal: Glucose maintained within target range  Description: INTERVENTIONS:  - Monitor Blood Glucose as ordered  - Assess for signs and symptoms of hyperglycemia and hypoglycemia  - Administer ordered medications to maintain glucose within target range  - Assess nutritional intake and initiate nutrition service referral as needed  Outcome: Progressing

## 2024-05-07 NOTE — ASSESSMENT & PLAN NOTE
Patient with history of GERD, treated with Pepcid 20 mg daily in the outpatient setting. Stable at present.    Plan  Continue PTA famotidine

## 2024-05-07 NOTE — ASSESSMENT & PLAN NOTE
"Patient initially presented to the ED with complaints of 2 days of right-sided flank pain.  Other associated symptoms included nausea/vomiting as well as urinary frequency though no virgil hematuria or urgency.  Patient does have a history of kidney stones with similar presentations in the past.      Initial lab studies notable for stable renal function, mild leukocytosis (12.25), UA with occult blood and 20-30 RBCs on microscopy    CT stone protocol: \"6 mm calculus at the right ureterovesical junction causing moderate hydronephrosis and moderate to severe hydroureter. Multiple nonobstructing bilateral renal calculi. Hepatomegaly and hepatic steatosis.\"    After discussion with urology, plan was initially to try and manage the patient conservatively with expulsive therapy and symptomatic control.  Unfortunately, her pain has persisted and she has not yet passed her stone.  Case was rediscussed with urology and decision was made to transfer the patient to [facility] for operative intervention    Patient underwent cystoscopy with pyelogram and no stone was seen but findings of passed stone were noted  Patient tolerated the procedure well without any complications    Plan  Outpatient urology and PCP follow up  "

## 2024-05-07 NOTE — H&P
"      INTERNAL MEDICINE RESIDENCY ADMISSION H&P     Name: Sha Muñoz   Age & Sex: 28 y.o. female   MRN: [id number]  Unit/Bed#: NW8 874-01   Encounter: [id number]  [facility]    Code Status: Level 1 - Full Code  Admission Status: INPATIENT   Disposition: Patient requires Med/Surg    Admit to team: SOD Team B     ASSESSMENT/PLAN     Principal Problem:    Calculus of ureterovesical junction (UVJ)  Active Problems:    Type 2 diabetes mellitus without complication, with long-term current use of insulin (HCC)    Hypertension, essential    Asthma    Class 3 severe obesity due to excess calories without serious comorbidity with body mass index (BMI) of 50.0 to 59.9 in adult (HCC)    GERD (gastroesophageal reflux disease)      * Calculus of ureterovesical junction (UVJ)  Assessment & Plan  Patient initially presented to the ED with complaints of 2 days of right-sided flank pain.  Other associated symptoms included nausea/vomiting as well as urinary frequency though no virgil hematuria or urgency.  Patient does have a history of kidney stones with similar presentations in the past.      Initial lab studies notable for stable renal function, mild leukocytosis (12.25), UA with occult blood and 20-30 RBCs on microscopy    CT stone protocol: \"6 mm calculus at the right ureterovesical junction causing moderate hydronephrosis and moderate to severe hydroureter. Multiple nonobstructing bilateral renal calculi. Hepatomegaly and hepatic steatosis.\"    After discussion with urology, plan was initially to try and manage the patient conservatively with expulsive therapy and symptomatic control.  Unfortunately, her pain has persisted and she has not yet passed her stone.  Case was rediscussed with urology today and decision was made to transfer the patient to [facility] for operative intervention, tentatively 5/8.    Plan  -Urology consulted, recommendations appreciated  -NPO midnight for " potential surgical intervention tomorrow  -Pain regimen: oxy 5/10, morphine 4 mg; titrate as needed  -Zofran PRN for nausea control  -Continue Flomax 0.4 mg daily  -Continue to strain urine for stone passage    Type 2 diabetes mellitus without complication, with long-term current use of insulin ([facility])  Assessment & Plan  Lab Results   Component Value Date    HGBA1C 12.3 (H) 10/13/2023       Recent Labs     24  2039 24  0627 24  1042 24  1545   POCGLU 99 95 141* 88       Blood Sugar Average: Last 72 hrs:    Patient with history of T2DM with insulin usage: home regimen includes Lantus 30 units daily. Glucose has bene overall well-controlled since arrival at the hospital.    Plan  -Currently on PTA dosing + SSI; adjust as needed pending NPO status  -Hypoglycemia protocol    Hypertension, essential  Assessment & Plan  Patient with history of hypertension, treated with Procardia 60 mg daily. Does also appear to have been taking Cozaar 25 mg daily and propranolol 80 mg daily though these latter two scripts are . Initially noted to be hypertensive in the ED to 180s (likely in setting of acute pain), though has since been better controlled with pressures ranging from 100-140s.     Plan  -For now, continue Procardia only  -Pending clinical course, consider resumption of other agents as needed    Asthma  Assessment & Plan  Patient with reported history of mild intermittent asthma without complication, treated with fluticasone and albuterol rescue inhaler in the outpatient setting. Respiratory status overall stable since admission.    PFT (2024)   Results:  The FVC is normal.  The FEV1 is normal.  The FEV1/FVC is normal.  No bronchodilator was administered.  The TLC is normal.  The DLCO is normal.  The Flow-Volume Loop demonstrates no significant abnormalities.  Impression:  The patient has evidence of normal pulmonary function testing. The flow  volume curve demonstrates no significant  abnormalities. The diffusion  capacity is normal.     Plan  -Continue home inhaler regimen    Class 3 severe obesity due to excess calories without serious comorbidity with body mass index (BMI) of 50.0 to 59.9 in adult (HCC)  Assessment & Plan  History noted. Also noted to have hepatomegaly and hepatic steatosis on recent CT imaging, as well as significantly elevated BMI.    Plan  -Recommend close outpatient follow-up and lifestyle modification on discharge  -Consider weight management referral on discharge    GERD (gastroesophageal reflux disease)  Assessment & Plan  Patient with history of GERD, treated with Pepcid 20 mg daily in the outpatient setting. Stable at present.    Plan  -Continue PTA famotidine        VTE Pharmacologic Prophylaxis: Reason for no pharmacologic prophylaxis low risk  VTE Mechanical Prophylaxis: sequential compression device    CHIEF COMPLAINT   No chief complaint on file.     HISTORY OF PRESENT ILLNESS     Patient is a 28 y.o. F with PMH notable for T2DM not on insulin therapy, HLD, HTN, asthma, and morbid obesity who originally presented to [facility] with complaints of 2 days of right-sided flank pain. She did endorse a history of nephrolithiasis with similar presentations in the past. Prior to her arrival at the hospital, she attempted symptomatic control with Tylenol without significant relief. Other associated symptoms at that time included nausea/vomiting as well as urinary frequency (though patient denied any hematuria or urgency).    On initial arrival in the ED, patient's vitals included the following: temp 97.5, HR 76, RR 18, /98, 95% on RA. Initial labs showed a mild leukocytosis (12.25), mild stable anemia (11.4), glucose 162, mildly elevated ALT (56) with normal LFTs otherwise. Lipase and pregnancy testing unremarkable. UA notable for occult blood with 20-30 RBCs seen on microscopy; no virgil evidence of infection otherwise. CT A/P showed a 6 mm stone at the right UPJ with  moderate hydronephrosis with moderate/severe hydroureter. Also noted was hepatomegaly and hepatic steatosis. The patient's case was discussed with Urology by the ED team, who originally elected to pursue admission for ongoing pain/symptom control as well as conservative expulsive therapy with hydration and Flomax. However, they did note that the patient may require transfer for operative intervention should her pain remain unresolved or her clinical status worsen. Despite conservative management, the patient's symptoms have persisted and thus the decision was made to transfer her to [facility] for operative Urology intervention, tentatively scheduled for 5/8.    Patient subsequently seen and examined on arrival at [facility]. At this time, she reports right flank pain radiating to groin. She also endorses reproducible chest pain with palpation. She denies any fevers, chills, shortness of breath, cough, dysuria, urgency, hematuria.     Patient will be admitted [facility] under the service of Dr. Silva pending further medical and operative management for her nephrolithiasis.    REVIEW OF SYSTEMS     Review of Systems   Constitutional:  Negative for chills and fever.   HENT:  Negative for ear pain and sore throat.    Eyes:  Negative for pain and visual disturbance.   Respiratory:  Negative for cough and shortness of breath.    Cardiovascular:  Negative for chest pain and palpitations.   Gastrointestinal:  Positive for nausea and vomiting. Negative for abdominal pain.   Genitourinary:  Positive for flank pain and frequency. Negative for dysuria, hematuria and urgency.   Musculoskeletal:  Negative for arthralgias and back pain.   Skin:  Negative for color change and rash.   Neurological:  Negative for seizures and syncope.   All other systems reviewed and are negative.    OBJECTIVE     Vitals:    05/08/24 0036   BP: 126/75   BP Location: Right arm   Pulse: 69   Resp: 16   Temp: 97.5 °F (36.4 °C)   TempSrc: Oral   SpO2: 98%       Temperature:   Temp (24hrs), Av °F (36.7 °C), Min:97.3 °F (36.3 °C), Max:98.7 °F (37.1 °C)    Temperature: 97.5 °F (36.4 °C)  Intake & Output:  I/O       None          Weights:        There is no height or weight on file to calculate BMI.  Weight (last 2 days)       None          Physical Exam  Vitals and nursing note reviewed.   Constitutional:       General: She is not in acute distress.     Appearance: She is well-developed.   HENT:      Head: Normocephalic and atraumatic.   Eyes:      Conjunctiva/sclera: Conjunctivae normal.   Cardiovascular:      Rate and Rhythm: Normal rate and regular rhythm.      Heart sounds: No murmur heard.  Pulmonary:      Effort: Pulmonary effort is normal. No respiratory distress.      Breath sounds: Normal breath sounds.   Abdominal:      Palpations: Abdomen is soft.      Tenderness: There is abdominal tenderness.      Comments: Suprapubic tenderness    Musculoskeletal:         General: Tenderness present.      Cervical back: Neck supple.      Right lower leg: No edema.      Left lower leg: No edema.      Comments: Right flank tenderness    Skin:     General: Skin is warm and dry.      Capillary Refill: Capillary refill takes less than 2 seconds.   Neurological:      Mental Status: She is alert.   Psychiatric:         Mood and Affect: Mood normal.       PAST MEDICAL HISTORY     Past Medical History:   Diagnosis Date    Abnormal CT of the head     Analgesic overuse headache     Asthma     Chronic pain of both shoulders     Diabetes mellitus (HCC)     Hyperlipidemia     Hypertension     Obesity, morbid, BMI 50 or higher (HCC)     Urethral diverticulum      PAST SURGICAL HISTORY     Past Surgical History:   Procedure Laterality Date     SECTION      x4    DILATION AND EVACUATION  2015    TUBAL LIGATION       SOCIAL & FAMILY HISTORY     Social History     Substance and Sexual Activity   Alcohol Use Yes       Social History     Substance and Sexual Activity   Drug Use Not  "Currently    Types: Marijuana     Social History     Tobacco Use   Smoking Status Former   Smokeless Tobacco Never     Family History   Problem Relation Age of Onset    Hypertension Mother     Heart disease Mother     Diabetes Mother     Asthma Mother     Sleep apnea Father     Heart disease Father     Asthma Father     Heart failure Father     Asthma Sister     Asthma Brother     Asthma Brother     Sleep apnea Brother     Asthma Son     Asthma Son     Asthma Son     Rashes / Skin problems Son     Diabetes Maternal Grandmother     Hypertension Maternal Grandmother     Diabetes Maternal Grandfather     Cancer Maternal Aunt         ovarian    Hypertension Maternal Aunt     Arthritis Maternal Aunt     Cancer Paternal Aunt         ovarian    Heart disease Paternal Aunt     Diabetes Paternal Aunt      LABORATORY DATA     Labs: I have personally reviewed pertinent reports.    Results from last 7 days   Lab Units 05/07/24  0544 05/06/24  0537 05/06/24  0009   WBC Thousand/uL 9.48 12.08* 12.25*   HEMOGLOBIN g/dL 9.5* 10.9* 11.4*   HEMATOCRIT % 33.0* 36.6 37.5   PLATELETS Thousands/uL 189 232 234   SEGS PCT % 66  --  74   MONO PCT % 5  --  5   EOS PCT % 3  --  1      Results from last 7 days   Lab Units 05/07/24  0544 05/06/24  0537 05/06/24  0009   POTASSIUM mmol/L 3.3* 3.4* 3.6   CHLORIDE mmol/L 110* 108 106   CO2 mmol/L 21 22 23   BUN mg/dL 20 20 22   CREATININE mg/dL 1.08 0.97 0.97   CALCIUM mg/dL 8.2* 8.6 9.3   ALK PHOS U/L  --   --  94   ALT U/L  --   --  56*   AST U/L  --   --  26     Results from last 7 days   Lab Units 05/07/24  0544   MAGNESIUM mg/dL 1.9     Results from last 7 days   Lab Units 05/07/24  0544   PHOSPHORUS mg/dL 4.0                  Micro:  No results found for: \"BLOODCX\", \"URINECX\", \"WOUNDCULT\", \"SPUTUMCULTUR\"  IMAGING & DIAGNOSTIC TESTS     Imaging: I have personally reviewed pertinent reports.    CT renal stone study abdomen pelvis wo contrast    Result Date: 5/6/2024  Impression: 6 mm calculus " at the right ureterovesical junction causing moderate hydronephrosis and moderate to severe hydroureter. Multiple nonobstructing bilateral renal calculi. Hepatomegaly and hepatic steatosis. The study was marked in EPIC for immediate notification. Workstation performed: [id number]     EKG, Pathology, and Other Studies: I have personally reviewed pertinent reports.     ALLERGIES     Allergies   Allergen Reactions    Pepper [Guggulipid-Black Pepper - Food Allergy] Shortness Of Breath    Metformin GI Intolerance    Cheese - Food Allergy Rash and Swelling     Swelling lips      MEDICATIONS PRIOR TO ARRIVAL     Prior to Admission medications    Medication Sig Start Date End Date Taking? Authorizing Provider   acetaminophen (TYLENOL) 500 mg tablet Take 1 tablet (500 mg total) by mouth every 6 (six) hours as needed for mild pain or moderate pain 11/21/22   Yanira Reyes PA-C   albuterol (ProAir HFA) 90 mcg/act inhaler Inhale 2 puffs every 6 (six) hours as needed for wheezing 9/17/22   Juan Carlos Treviño Jr., PA-C   busPIRone (BUSPAR) 15 mg tablet Take 15 mg by mouth 2 (two) times a day 2/8/24   Historical Provider, MD   butalbital-acetaminophen-caffeine (Esgic) -40 mg per tablet Take 1 tablet by mouth every 4 (four) hours as needed for headaches 11/27/21   Flakito Joiner PA-C   citalopram (CeleXA) 20 mg tablet Take 20 mg by mouth daily 11/16/23   Historical Provider, MD   diphenhydrAMINE (SOMINEX) 25 MG tablet Take 25 mg by mouth every 6 (six) hours as needed 9/14/22 10/14/22  Historical Provider, MD   doxepin (SINEquan) 75 MG capsule Take 75 mg by mouth daily at bedtime 11/16/23   Historical Provider, MD   Dulaglutide (Trulicity) 1.5 MG/0.5ML SOPN Inject 1.5 mg under the skin Once a week 9/18/22   Historical Provider, MD   famotidine (PEPCID) 20 mg tablet Take 20 mg by mouth daily at bedtime 2/5/24 2/4/25  Historical Provider, MD   fluticasone (FLONASE) 50 mcg/act nasal spray 1 spray into each nostril daily 11/21/22    Yanira Reyes PA-C   fluticasone (Flovent HFA) 44 mcg/act inhaler Inhale 2 puffs 2 (two) times a day 6/1/22 6/1/23  Historical Provider, MD   ibuprofen (MOTRIN) 800 mg tablet Take 800 mg by mouth 2 (two) times a day as needed 6/2/22 6/2/23  Historical Provider, MD   Lantus SoloStar 100 units/mL SOPN Inject 30 Units under the skin daily 3/21/24   Historical Provider, MD   losartan (COZAAR) 25 mg tablet Take 25 mg by mouth daily 6/1/22 6/1/23  Historical Provider, MD   NIFEdipine (PROCARDIA XL) 60 mg 24 hr tablet Take 60 mg by mouth daily 2/5/24   Historical Provider, MD   ondansetron (ZOFRAN) 8 mg tablet Take 8 mg by mouth every 12 (twelve) hours as needed 6/2/22   Historical Provider, MD   prochlorperazine (COMPAZINE) 5 mg tablet Take 5 mg by mouth every 6 (six) hours as needed 9/14/22 9/21/22  Historical Provider, MD   propranolol (INDERAL) 80 mg tablet Take 80 mg by mouth 2 (two) times a day 1/27/20 1/26/21  Historical Provider, MD   rizatriptan (MAXALT) 10 mg tablet Take 10 mg by mouth 6/2/22 6/2/23  Historical Provider, MD     MEDICATIONS ADMINISTERED IN LAST 24 HOURS     CURRENT MEDICATIONS     Current Facility-Administered Medications   Medication Dose Route Frequency Provider Last Rate    acetaminophen  650 mg Oral Q4H PRN Dione No MD      albuterol  2 puff Inhalation Q6H PRN Dione No MD      busPIRone  15 mg Oral BID Dione No MD      citalopram  20 mg Oral Daily Dione No MD      diphenhydrAMINE  25 mg Oral Q6H PRN Dione No MD      docusate sodium  100 mg Oral BID Dione No MD      doxepin  75 mg Oral HS Dione No MD      famotidine  20 mg Oral HS Dione No MD      insulin glargine  30 Units Subcutaneous QAM Dione No MD      insulin lispro  1-6 Units Subcutaneous HS Dione No MD      insulin lispro  2-12 Units Subcutaneous TID AC Dione No MD      insulin lispro  5 Units Subcutaneous TID With Meals Dione No MD      ketorolac  15 mg Intravenous Q6H [facility] Dione No MD      morphine injection   "4 mg Intravenous Q4H PRN Dione No MD      NIFEdipine  60 mg Oral Daily Dione No MD      ondansetron  4 mg Intravenous Q6H PRN Dione No MD      oxyCODONE  10 mg Oral Q4H PRN Dione No MD      oxyCODONE  5 mg Oral Q4H PRN Dione No MD      sodium chloride  150 mL/hr Intravenous Continuous Dione No MD      tamsulosin  0.4 mg Oral Daily With Dinner Dione No MD         sodium chloride, 150 mL/hr      acetaminophen, 650 mg, Q4H PRN  albuterol, 2 puff, Q6H PRN  diphenhydrAMINE, 25 mg, Q6H PRN  morphine injection, 4 mg, Q4H PRN  ondansetron, 4 mg, Q6H PRN  oxyCODONE, 10 mg, Q4H PRN  oxyCODONE, 5 mg, Q4H PRN        Admission Time  I spent 30 minutes admitting the patient.  This involved direct patient contact where I performed a full history and physical, reviewing previous records, and reviewing laboratory and other diagnostic studies.    Portions of the record may have been created with voice recognition software.  Occasional wrong word or \"sound a like\" substitutions may have occurred due to the inherent limitations of voice recognition software.  Read the chart carefully and recognize, using context, where substitutions have occurred.    ==  Emilie Soyeon Kim, MD  [facility]  Internal Medicine Residency PGY-3     "

## 2024-05-07 NOTE — ASSESSMENT & PLAN NOTE
Patient with flank pain for 2 days, hx of nephrolithiasis  Patient is hemodynamically stable, still symptomatic and has not passed the stone yet   Discussed with urologist due to persistent pain despite conservative management -recommended transfer to [facility] for intervention tomorrow  CT: 6 mm calculus at the right ureterovesical junction causing moderate hydronephrosis and moderate to severe hydroureter. Multiple nonobstructing bilateral renal calculi.   Pain control

## 2024-05-07 NOTE — PLAN OF CARE
Problem: PAIN - ADULT  Goal: Verbalizes/displays adequate comfort level or baseline comfort level  Description: Interventions:  - Encourage patient to monitor pain and request assistance  - Assess pain using appropriate pain scale  - Administer analgesics based on type and severity of pain and evaluate response  - Implement non-pharmacological measures as appropriate and evaluate response  - Consider cultural and social influences on pain and pain management  - Notify physician/advanced practitioner if interventions unsuccessful or patient reports new pain  Outcome: Progressing     Problem: SAFETY ADULT  Goal: Patient will remain free of falls  Description: INTERVENTIONS:  - Educate patient/family on patient safety including physical limitations  - Instruct patient to call for assistance with activity   - Consult OT/PT to assist with strengthening/mobility   - Keep Call bell within reach  - Keep bed low and locked with side rails adjusted as appropriate  - Keep care items and personal belongings within reach  - Initiate and maintain comfort rounds  - Make Fall Risk Sign visible to staff  - Offer Toileting every 2 Hours, in advance of need  - Apply yellow socks and bracelet for high fall risk patients  - Consider moving patient to room near nurses station  Outcome: Progressing  Goal: Maintain or return to baseline ADL function  Description: INTERVENTIONS:  -  Assess patient's ability to carry out ADLs; assess patient's baseline for ADL function and identify physical deficits which impact ability to perform ADLs (bathing, care of mouth/teeth, toileting, grooming, dressing, etc.)  - Assess/evaluate cause of self-care deficits   - Assess range of motion  - Assess patient's mobility; develop plan if impaired  - Assess patient's need for assistive devices and provide as appropriate  - Encourage maximum independence but intervene and supervise when necessary  - Involve family in performance of ADLs  - Assess for home  care needs following discharge   - Consider OT consult to assist with ADL evaluation and planning for discharge  - Provide patient education as appropriate  Outcome: Progressing  Goal: Maintains/Returns to pre admission functional level  Description: INTERVENTIONS:  - Perform AM-PAC 6 Click Basic Mobility/ Daily Activity assessment daily.  - Set and communicate daily mobility goal to care team and patient/family/caregiver.   - Collaborate with rehabilitation services on mobility goals if consulted  - Perform Range of Motion 2 times a day.  - Reposition patient every 2 hours.  - Dangle patient 2 times a day  - Stand patient 2 times a day  - Ambulate patient 2 times a day  - Out of bed to chair 2 times a day   - Out of bed for meals 2 times a day  - Out of bed for toileting  - Record patient progress and toleration of activity level   Outcome: Progressing     Problem: DISCHARGE PLANNING  Goal: Discharge to home or other facility with appropriate resources  Description: INTERVENTIONS:  - Identify barriers to discharge w/patient and caregiver  - Arrange for needed discharge resources and transportation as appropriate  - Identify discharge learning needs (meds, wound care, etc.)  - Arrange for interpretive services to assist at discharge as needed  - Refer to Case Management Department for coordinating discharge planning if the patient needs post-hospital services based on physician/advanced practitioner order or complex needs related to functional status, cognitive ability, or social support system  Outcome: Progressing     Problem: Knowledge Deficit  Goal: Patient/family/caregiver demonstrates understanding of disease process, treatment plan, medications, and discharge instructions  Description: Complete learning assessment and assess knowledge base.  Interventions:  - Provide teaching at level of understanding  - Provide teaching via preferred learning methods  Outcome: Progressing     Problem: Knowledge  Deficit  Goal: Patient/family/caregiver demonstrates understanding of disease process, treatment plan, medications, and discharge instructions  Description: Complete learning assessment and assess knowledge base.  Interventions:  - Provide teaching at level of understanding  - Provide teaching via preferred learning methods  Outcome: Progressing     Problem: METABOLIC, FLUID AND ELECTROLYTES - ADULT  Goal: Glucose maintained within target range  Description: INTERVENTIONS:  - Monitor Blood Glucose as ordered  - Assess for signs and symptoms of hyperglycemia and hypoglycemia  - Administer ordered medications to maintain glucose within target range  - Assess nutritional intake and initiate nutrition service referral as needed  Outcome: Progressing

## 2024-05-07 NOTE — ASSESSMENT & PLAN NOTE
Lab Results   Component Value Date    HGBA1C 12.3 (H) 10/13/2023       Recent Labs     05/06/24  1541 05/06/24 2039 05/07/24 0627 05/07/24  1042   POCGLU 98 99 95 141*       Blood Sugar Average: Last 72 hrs:  (P) 108  Poorly controlled diabetes, w/ A1c of 12.3  Meal coverage and SSI coverage with accuchecks  Hold trulicity

## 2024-05-07 NOTE — UTILIZATION REVIEW
Initial Clinical Review    Admission: Date/Time/Statement:   Admission Orders (From admission, onward)       Ordered        05/06/24 0327  Place in Observation  Once                          Orders Placed This Encounter   Procedures    Place in Observation     Standing Status:   Standing     Number of Occurrences:   1     Order Specific Question:   Level of Care     Answer:   Med Surg [16]     ED Arrival Information       Expected   -    Arrival   5/5/2024 23:38    Acuity   Urgent              Means of arrival   Walk-In    Escorted by   Self    Service   Hospitalist    Admission type   Emergency              Arrival complaint   abdominal/back/flank pain             Chief Complaint   Patient presents with    Flank Pain     Right sided flank pain started yesterday but got worse today   has a history of kidney stones    states she is voiding frequently   taking tylenol and motrin without relief           Initial Presentation: 28 y.o. female presents to ED from home  with right sided flank pain for past  2 days.  Ct scan shows calculus at  R  UVJ with hydronephrosis.   PMH  is   asthma, HTN and DM, not on insulin. Admit  Observation with   Calculus  UVJ and plan is  pain control, monitor labs, urology consult and continue home meds.            Anticipated Length of Stay/Certification Statement: Patient will be admitted on an observation basis with an anticipated length of stay of less than 2 midnights secondary to IVF, pain control.       ED Triage Vitals [05/05/24 2345]   Temperature Pulse Respirations Blood Pressure SpO2   97.5 °F (36.4 °C) 76 18 (!) 184/98 95 %      Temp Source Heart Rate Source Patient Position - Orthostatic VS BP Location FiO2 (%)   Tympanic Monitor Lying Left arm --      Pain Score       10 - Worst Possible Pain          Wt Readings from Last 1 Encounters:   05/06/24 124 kg (272 lb 14.9 oz)     Additional Vital Signs:   97.8 °F (36.6 °C) 64 18 106/53 62 Abnormal  98 % None (Room air) Sitting     05/06/24 2316 97.8 °F (36.6 °C) 69 18 100/62 70 97 % None (Room air) Lying   05/06/24 1543 98 °F (36.7 °C) 59 16 126/68 80 96 % None (Room air) Lying   05/06/24 0735 97.9 °F (36.6 °C) 96 16 113/71 79 97 % None (Room air) Lying   05/06/24 0413 98.1 °F (36.7 °C) 69 18 139/83 94 96 % None (Room air) Lying   05/06/24 0300 -- 63 18 143/86 -- 95 % None (Room air) Lying   05/06/24 0200 -- 68 18 131/80 -- 98 % None (Room air) Lying   05/06/24 0100 -- 70 18 145/87 -- 97 % None (Room air) Lying   05/06/24 0036 -- 69 20 146/84 -- 98 % None (Room air) Lying   05/06/24 0016 -- -- -- -- -- -- None (Room air) --   05/05/24 2345 97.5 °F (36.4 °C) 76 18 184/98 Abnormal  -- 95 % None (Room air) Lying     Pertinent Labs/Diagnostic Test Results:   CT renal stone study abdomen pelvis wo contrast   Final Result by Saba Ryan MD (05/06 0152)      6 mm calculus at the right ureterovesical junction causing moderate hydronephrosis and moderate to severe hydroureter.      Multiple nonobstructing bilateral renal calculi.      Hepatomegaly and hepatic steatosis.      The study was marked in EPIC for immediate notification.         Workstation performed: [id number]               Results from last 7 days   Lab Units 05/07/24  0544 05/06/24  0537 05/06/24  0009   WBC Thousand/uL 9.48 12.08* 12.25*   HEMOGLOBIN g/dL 9.5* 10.9* 11.4*   HEMATOCRIT % 33.0* 36.6 37.5   PLATELETS Thousands/uL 189 232 234   TOTAL NEUT ABS Thousands/µL 6.20  --  9.08*         Results from last 7 days   Lab Units 05/07/24  0544 05/06/24  0537 05/06/24  0009   SODIUM mmol/L 139 139 138   POTASSIUM mmol/L 3.3* 3.4* 3.6   CHLORIDE mmol/L 110* 108 106   CO2 mmol/L 21 22 23   ANION GAP mmol/L 8 9 9   BUN mg/dL 20 20 22   CREATININE mg/dL 1.08 0.97 0.97   EGFR ml/min/1.73sq m 70 79 79   CALCIUM mg/dL 8.2* 8.6 9.3   MAGNESIUM mg/dL 1.9  --   --    PHOSPHORUS mg/dL 4.0  --   --      Results from last 7 days   Lab Units 05/06/24  0009   AST U/L 26   ALT U/L 56*   ALK PHOS  U/L 94   TOTAL PROTEIN g/dL 7.9   ALBUMIN g/dL 4.2   TOTAL BILIRUBIN mg/dL 0.24     Results from last 7 days   Lab Units 05/07/24  0627 05/06/24  2039 05/06/24  1541 05/06/24  1115 05/06/24  0451   POC GLUCOSE mg/dl 95 99 98 86 129     Results from last 7 days   Lab Units 05/07/24  0544 05/06/24  0537 05/06/24  0009   GLUCOSE RANDOM mg/dL 85 107 162*                   Results from last 7 days   Lab Units 05/06/24  0009   LIPASE u/L 71                 Results from last 7 days   Lab Units 05/06/24  0009   CLARITY UA  Clear   COLOR UA  Straw   SPEC GRAV UA  1.010   PH UA  7.0   GLUCOSE UA mg/dl Negative   KETONES UA mg/dl Negative   BLOOD UA  250.0*   PROTEIN UA mg/dl Negative   NITRITE UA  Negative   BILIRUBIN UA  Negative   UROBILINOGEN UA mg/dL Negative   LEUKOCYTES UA  Negative   WBC UA /hpf None Seen   RBC UA /hpf 20-30*   BACTERIA UA /hpf Occasional   EPITHELIAL CELLS WET PREP /hpf Occasional         ED Treatment:   Medication Administration from 05/05/2024 2338 to 05/06/2024 0409         Date/Time Order Dose Route Action Comments     05/06/2024 0235 EDT sodium chloride 0.9 % bolus 1,000 mL 0 mL Intravenous Stopped --     05/06/2024 0010 EDT sodium chloride 0.9 % bolus 1,000 mL 1,000 mL Intravenous New Bag --     05/06/2024 0012 EDT ketorolac (TORADOL) injection 15 mg 15 mg Intravenous Given --     05/06/2024 0012 EDT ondansetron (ZOFRAN) injection 4 mg 4 mg Intravenous Given --     05/06/2024 0044 EDT morphine injection 4 mg 4 mg Intravenous Given --     05/06/2024 0230 EDT fentaNYL injection 50 mcg 50 mcg Intravenous Given --     05/06/2024 0230 EDT tamsulosin (FLOMAX) capsule 0.4 mg 0.4 mg Oral Given --     05/06/2024 0345 EDT sodium chloride 0.9 % infusion 150 mL/hr Intravenous New Bag --            Present on Admission:   Calculus of ureterovesical junction (UVJ)   Hypertension, essential      Admitting Diagnosis: Nephrolithiasis [N20.0]  Flank pain [R10.9]  Intractable pain [R52]  Age/Sex: 28 y.o.  female  Admission Orders:  Scheduled Medications:  busPIRone, 15 mg, Oral, BID  citalopram, 20 mg, Oral, Daily  docusate sodium, 100 mg, Oral, BID  doxepin, 75 mg, Oral, HS  famotidine, 20 mg, Oral, HS  insulin glargine, 30 Units, Subcutaneous, QAM  insulin lispro, 1-6 Units, Subcutaneous, HS  insulin lispro, 2-12 Units, Subcutaneous, TID AC  insulin lispro, 5 Units, Subcutaneous, TID With Meals  ketorolac, 15 mg, Intravenous, Q6H DENY  NIFEdipine, 60 mg, Oral, Daily  tamsulosin, 0.4 mg, Oral, Daily With Dinner      Continuous IV Infusions:  sodium chloride, 150 mL/hr, Intravenous, Continuous      PRN Meds:  acetaminophen, 650 mg, Oral, Q4H PRN  albuterol, 2 puff, Inhalation, Q6H PRN  diphenhydrAMINE, 25 mg, Oral, Q6H PRN  morphine injection, 4 mg, Intravenous, Q4H PRN  ondansetron, 4 mg, Intravenous, Q6H PRN  oxyCODONE, 10 mg, Oral, Q4H PRN  oxyCODONE, 5 mg, Oral, Q4H PRN      Network Utilization Review Department  ATTENTION: Please call with any questions or concerns to [phone] and carefully listen to the prompts so that you are directed to the right person. All voicemails are confidential.   For Discharge needs, contact Care Management DC Support Team at [phone] opt. [phone]  Send all requests for admission clinical reviews, approved or denied determinations and any other requests to dedicated fax number below belonging to the [place] where the patient is receiving treatment. List of dedicated fax numbers for the Facilities:  FACILITY NAME UR FAX NUMBER   ADMISSION DENIALS (Administrative/Medical Necessity) [phone]   DISCHARGE SUPPORT TEAM (NETWORK) [phone]   PARENT CHILD HEALTH (Maternity/NICU/Pediatrics) [phone]   [facility] [phone]   [facility] [phone]   [facility] [phone]   General acute hospital [phone]   [facility] [phone]   [facility]  [facility] [phone]   [facility] [phone]   [facility] [phone]   [facility] [phone]   [facility] [phone]   [facility] [phone]   [facility] [phone]

## 2024-05-07 NOTE — DISCHARGE SUMMARY
[facility]  Discharge- hSa Muñoz 1995, 28 y.o. female MRN: [id number]  Unit/Bed#: 7T [facility] 710-01 Encounter: [id number]  Primary Care Provider: [facility]   Date and time admitted to hospital: 5/5/2024 11:44 PM    * Calculus of ureterovesical junction (UVJ)  Assessment & Plan  Patient with flank pain for 2 days, hx of nephrolithiasis  Patient is hemodynamically stable, still symptomatic and has not passed the stone yet   Discussed with urologist due to persistent pain despite conservative management -recommended transfer to [facility] for intervention tomorrow  CT: 6 mm calculus at the right ureterovesical junction causing moderate hydronephrosis and moderate to severe hydroureter. Multiple nonobstructing bilateral renal calculi.   Pain control      Type 2 diabetes mellitus without complication, with long-term current use of insulin ([facility])  Assessment & Plan  Lab Results   Component Value Date    HGBA1C 12.3 (H) 10/13/2023       Recent Labs     05/06/24  1541 05/06/24  2039 05/07/24  0627 05/07/24  1042   POCGLU 98 99 95 141*       Blood Sugar Average: Last 72 hrs:  (P) 108  Poorly controlled diabetes, w/ A1c of 12.3  Meal coverage and SSI coverage with accuchecks  Hold trulicity    Hypertension, essential  Assessment & Plan  Continue home medications with hold parameters    Class 3 severe obesity due to excess calories without serious comorbidity with body mass index (BMI) of 50.0 to 59.9 in adult ([facility])  Assessment & Plan  BMI 50  Healthy diet and lifestyle modification recommended        Discharging Physician / Practitioner: Dione No MD  PCP: [facility]  Admission Date:   Admission Orders (From admission, onward)       Ordered        05/06/24 0327  Place in Observation  Once                          Discharge Date: 05/07/24    Medical Problems       Resolved Problems  Date Reviewed: 5/7/2024   None         Consultations During Hospital  "Stay:  Urologist    Procedures Performed:   Not applicable    Significant Findings / Test Results:   CT renal stone study abdomen pelvis wo contrast  Result Date: 5/6/2024  Impression: 6 mm calculus at the right ureterovesical junction causing moderate hydronephrosis and moderate to severe hydroureter. Multiple nonobstructing bilateral renal calculi. Hepatomegaly and hepatic steatosis. The study was marked in EPIC for immediate notification. Workstation performed: [id number]        Incidental Findings:   See above    Test Results Pending at Discharge (will require follow up):   Not applicable     Outpatient Tests Requested:  Not applicable    Complications: None    Reason for Admission: Flank pain    Hospital Course:     Sha Muñoz is a 28 y.o. female patient with PMH of nephrolithiasis who originally presented to the hospital on 5/5/2024 due to right-sided flank pain for 2 days.  Patient is hemodynamically stable.  CT A/P showed 6 mm calculus at right UVJ causing moderate hydronephrosis.  Patient has persistent pain after 24-hour despite extensive pain regimen.  She has not passed the stone with conservative management.  Discussed with urologist who recommended [facility] transfer for intervention tomorrow.    Please see above list of diagnoses and related plan for additional information.     Condition at Discharge: stable     Discharge Day Visit / Exam:     Subjective:  Patient was seen and examined at bedside. The patient complains of right flank pain 7/10 despite recent oxycodone 10 mg.  Patient is agreeable for transfer.    Vitals: Blood Pressure: 106/53 (05/07/24 0732)  Pulse: 64 (05/07/24 0732)  Temperature: 97.8 °F (36.6 °C) (05/07/24 0732)  Temp Source: Temporal (05/07/24 0732)  Respirations: 18 (05/07/24 0732)  Height: 5' 2\" (157.5 cm) (05/06/24 0413)  Weight - Scale: 124 kg (272 lb 14.9 oz) (05/06/24 0413)  SpO2: 98 % (05/07/24 0732)  Exam:   Physical Exam  General: no acute distress  HEENT: NC/AT, " PERRL, EOM - normal  Neck: Supple  Pulm/Chest: Normal chest wall expansion, clear breath sounds on both side  CVS: normal S1&S2, capillary refill <2s  Abd: soft, non tender, non distended, bowel sounds +  MSK: move all 4 extremities spontaneously  Skin: warm  CNS: no acute focal neuro deficit    Discussion with Family: Discussed with patient    Discharge instructions/Information to patient and family:   See after visit summary for information provided to patient and family.      Provisions for Follow-Up Care:  See after visit summary for information related to follow-up care and any pertinent home health orders.      Disposition:     Acute Care Hospital Transfer to [facility]    For Discharges to [facility] SNF:   Not Applicable to this Patient - Not Applicable to this Patient    Planned Readmission: No     Discharge Statement:  I spent 45 minutes discharging the patient. This time was spent on the day of discharge. I had direct contact with the patient on the day of discharge. Greater than 50% of the total time was spent examining patient, answering all patient questions, arranging and discussing plan of care with patient as well as directly providing post-discharge instructions.  Additional time then spent on discharge activities.    Discharge Medications:  See after visit summary for reconciled discharge medications provided to patient and family.      ** Please Note: This note has been constructed using a voice recognition system **

## 2024-05-07 NOTE — ASSESSMENT & PLAN NOTE
Lab Results   Component Value Date    HGBA1C 6.8 (H) 05/06/2024       Recent Labs     05/08/24  0750 05/08/24  1058 05/08/24  1506 05/08/24  1628   POCGLU 84 84 83 146*       Blood Sugar Average: Last 72 hrs:  (P) 99.25  Patient with history of T2DM with insulin usage: home regimen includes Lantus 30 units daily and insulin lispro 5 units TID    Plan  Continue home regimen on discharge  PCP follow up

## 2024-05-07 NOTE — ASSESSMENT & PLAN NOTE
Patient with history of hypertension, treated with Procardia 60 mg daily. Does also appear to have been taking Cozaar 25 mg daily and propranolol 80 mg daily though these latter two scripts are . Initially noted to be hypertensive in the ED to 180s (likely in setting of acute pain), though has since been better controlled with pressures ranging from 100-140s.     Plan  Only Procardia continued while admission with normal blood pressures  Follow up with PCP in 1-2 weeks

## 2024-05-08 ENCOUNTER — ANESTHESIA (INPATIENT)
Dept: PERIOP | Facility: HOSPITAL | Age: 29
End: 2024-05-08
Payer: MEDICARE

## 2024-05-08 ENCOUNTER — ANESTHESIA EVENT (INPATIENT)
Dept: PERIOP | Facility: HOSPITAL | Age: 29
End: 2024-05-08
Payer: MEDICARE

## 2024-05-08 ENCOUNTER — HOSPITAL ENCOUNTER (INPATIENT)
Facility: HOSPITAL | Age: 29
LOS: 1 days | Discharge: HOME | End: 2024-05-08
Attending: INTERNAL MEDICINE | Admitting: INTERNAL MEDICINE
Payer: MEDICARE

## 2024-05-08 ENCOUNTER — APPOINTMENT (INPATIENT)
Dept: RADIOLOGY | Facility: HOSPITAL | Age: 29
End: 2024-05-08
Payer: MEDICARE

## 2024-05-08 VITALS
OXYGEN SATURATION: 94 % | SYSTOLIC BLOOD PRESSURE: 120 MMHG | HEART RATE: 67 BPM | TEMPERATURE: 97.6 F | RESPIRATION RATE: 18 BRPM | DIASTOLIC BLOOD PRESSURE: 72 MMHG

## 2024-05-08 DIAGNOSIS — N20.1 CALCULUS OF URETEROVESICAL JUNCTION (UVJ): Primary | ICD-10-CM

## 2024-05-08 DIAGNOSIS — Z79.4 TYPE 2 DIABETES MELLITUS WITHOUT COMPLICATION, WITH LONG-TERM CURRENT USE OF INSULIN (HCC): ICD-10-CM

## 2024-05-08 DIAGNOSIS — E11.9 TYPE 2 DIABETES MELLITUS WITHOUT COMPLICATION, WITH LONG-TERM CURRENT USE OF INSULIN (HCC): ICD-10-CM

## 2024-05-08 PROBLEM — R07.9 CHEST PAIN: Status: ACTIVE | Noted: 2024-05-08

## 2024-05-08 LAB
ANION GAP SERPL CALCULATED.3IONS-SCNC: 10 MMOL/L (ref 4–13)
BASOPHILS # BLD AUTO: 0.02 THOUSANDS/ÂΜL (ref 0–0.1)
BASOPHILS NFR BLD AUTO: 0 % (ref 0–1)
BUN SERPL-MCNC: 14 MG/DL (ref 5–25)
CALCIUM SERPL-MCNC: 8.1 MG/DL (ref 8.4–10.2)
CHLORIDE SERPL-SCNC: 111 MMOL/L (ref 96–108)
CO2 SERPL-SCNC: 21 MMOL/L (ref 21–32)
CREAT SERPL-MCNC: 0.81 MG/DL (ref 0.6–1.3)
EOSINOPHIL # BLD AUTO: 0.26 THOUSAND/ÂΜL (ref 0–0.61)
EOSINOPHIL NFR BLD AUTO: 4 % (ref 0–6)
ERYTHROCYTE [DISTWIDTH] IN BLOOD BY AUTOMATED COUNT: 13.9 % (ref 11.6–15.1)
GFR SERPL CREATININE-BSD FRML MDRD: 99 ML/MIN/1.73SQ M
GLUCOSE SERPL-MCNC: 146 MG/DL (ref 65–140)
GLUCOSE SERPL-MCNC: 82 MG/DL (ref 65–140)
GLUCOSE SERPL-MCNC: 83 MG/DL (ref 65–140)
GLUCOSE SERPL-MCNC: 84 MG/DL (ref 65–140)
GLUCOSE SERPL-MCNC: 84 MG/DL (ref 65–140)
HCT VFR BLD AUTO: 31.4 % (ref 34.8–46.1)
HGB BLD-MCNC: 9.7 G/DL (ref 11.5–15.4)
IMM GRANULOCYTES # BLD AUTO: 0.02 THOUSAND/UL (ref 0–0.2)
IMM GRANULOCYTES NFR BLD AUTO: 0 % (ref 0–2)
LYMPHOCYTES # BLD AUTO: 2.39 THOUSANDS/ÂΜL (ref 0.6–4.47)
LYMPHOCYTES NFR BLD AUTO: 33 % (ref 14–44)
MCH RBC QN AUTO: 27 PG (ref 26.8–34.3)
MCHC RBC AUTO-ENTMCNC: 30.9 G/DL (ref 31.4–37.4)
MCV RBC AUTO: 88 FL (ref 82–98)
MONOCYTES # BLD AUTO: 0.29 THOUSAND/ÂΜL (ref 0.17–1.22)
MONOCYTES NFR BLD AUTO: 4 % (ref 4–12)
NEUTROPHILS # BLD AUTO: 4.29 THOUSANDS/ÂΜL (ref 1.85–7.62)
NEUTS SEG NFR BLD AUTO: 59 % (ref 43–75)
NRBC BLD AUTO-RTO: 0 /100 WBCS
PLATELET # BLD AUTO: 199 THOUSANDS/UL (ref 149–390)
PMV BLD AUTO: 11 FL (ref 8.9–12.7)
POTASSIUM SERPL-SCNC: 3.5 MMOL/L (ref 3.5–5.3)
RBC # BLD AUTO: 3.59 MILLION/UL (ref 3.81–5.12)
SODIUM SERPL-SCNC: 142 MMOL/L (ref 135–147)
WBC # BLD AUTO: 7.27 THOUSAND/UL (ref 4.31–10.16)

## 2024-05-08 PROCEDURE — NC001 PR NO CHARGE: Performed by: INTERNAL MEDICINE

## 2024-05-08 PROCEDURE — 52351 CYSTOURETERO & OR PYELOSCOPE: CPT | Performed by: UROLOGY

## 2024-05-08 PROCEDURE — 85025 COMPLETE CBC W/AUTO DIFF WBC: CPT

## 2024-05-08 PROCEDURE — 74420 UROGRAPHY RTRGR +-KUB: CPT

## 2024-05-08 PROCEDURE — C1758 CATHETER, URETERAL: HCPCS | Performed by: UROLOGY

## 2024-05-08 PROCEDURE — 0TJ98ZZ INSPECTION OF URETER, VIA NATURAL OR ARTIFICIAL OPENING ENDOSCOPIC: ICD-10-PCS | Performed by: UROLOGY

## 2024-05-08 PROCEDURE — 80048 BASIC METABOLIC PNL TOTAL CA: CPT

## 2024-05-08 PROCEDURE — 82948 REAGENT STRIP/BLOOD GLUCOSE: CPT

## 2024-05-08 PROCEDURE — C1769 GUIDE WIRE: HCPCS | Performed by: UROLOGY

## 2024-05-08 PROCEDURE — BT1D1ZZ FLUOROSCOPY OF RIGHT KIDNEY, URETER AND BLADDER USING LOW OSMOLAR CONTRAST: ICD-10-PCS | Performed by: UROLOGY

## 2024-05-08 PROCEDURE — 99223 1ST HOSP IP/OBS HIGH 75: CPT | Performed by: UROLOGY

## 2024-05-08 PROCEDURE — 0T768DZ DILATION OF RIGHT URETER WITH INTRALUMINAL DEVICE, VIA NATURAL OR ARTIFICIAL OPENING ENDOSCOPIC: ICD-10-PCS | Performed by: UROLOGY

## 2024-05-08 PROCEDURE — 52332 CYSTOSCOPY AND TREATMENT: CPT | Performed by: UROLOGY

## 2024-05-08 PROCEDURE — C2617 STENT, NON-COR, TEM W/O DEL: HCPCS | Performed by: UROLOGY

## 2024-05-08 PROCEDURE — 99222 1ST HOSP IP/OBS MODERATE 55: CPT | Performed by: INTERNAL MEDICINE

## 2024-05-08 DEVICE — INLAY OPTIMA URETERAL STENT W/O GUIDEWIRE
Type: IMPLANTABLE DEVICE | Site: URETER | Status: FUNCTIONAL
Brand: BARD® INLAY OPTIMA® URETERAL STENT

## 2024-05-08 RX ORDER — OXYBUTYNIN CHLORIDE 5 MG/1
5 TABLET ORAL EVERY 6 HOURS PRN
Qty: 30 TABLET | Refills: 0 | Status: SHIPPED | OUTPATIENT
Start: 2024-05-08

## 2024-05-08 RX ORDER — PROPOFOL 10 MG/ML
INJECTION, EMULSION INTRAVENOUS AS NEEDED
Status: DISCONTINUED | OUTPATIENT
Start: 2024-05-08 | End: 2024-05-08

## 2024-05-08 RX ORDER — OXYCODONE HYDROCHLORIDE 5 MG/1
5 TABLET ORAL EVERY 4 HOURS PRN
Qty: 5 TABLET | Refills: 0 | Status: SHIPPED | OUTPATIENT
Start: 2024-05-08 | End: 2024-05-13

## 2024-05-08 RX ORDER — FAMOTIDINE 20 MG/1
20 TABLET, FILM COATED ORAL
Status: DISCONTINUED | OUTPATIENT
Start: 2024-05-08 | End: 2024-05-08 | Stop reason: HOSPADM

## 2024-05-08 RX ORDER — OXYCODONE HYDROCHLORIDE 10 MG/1
10 TABLET ORAL EVERY 4 HOURS PRN
Status: DISCONTINUED | OUTPATIENT
Start: 2024-05-08 | End: 2024-05-08 | Stop reason: HOSPADM

## 2024-05-08 RX ORDER — ONDANSETRON 2 MG/ML
4 INJECTION INTRAMUSCULAR; INTRAVENOUS EVERY 6 HOURS PRN
Status: DISCONTINUED | OUTPATIENT
Start: 2024-05-08 | End: 2024-05-08 | Stop reason: HOSPADM

## 2024-05-08 RX ORDER — ONDANSETRON 2 MG/ML
4 INJECTION INTRAMUSCULAR; INTRAVENOUS ONCE AS NEEDED
Status: DISCONTINUED | OUTPATIENT
Start: 2024-05-08 | End: 2024-05-08 | Stop reason: HOSPADM

## 2024-05-08 RX ORDER — LIDOCAINE HYDROCHLORIDE 10 MG/ML
INJECTION, SOLUTION EPIDURAL; INFILTRATION; INTRACAUDAL; PERINEURAL AS NEEDED
Status: DISCONTINUED | OUTPATIENT
Start: 2024-05-08 | End: 2024-05-08

## 2024-05-08 RX ORDER — FENTANYL CITRATE 50 UG/ML
INJECTION, SOLUTION INTRAMUSCULAR; INTRAVENOUS AS NEEDED
Status: DISCONTINUED | OUTPATIENT
Start: 2024-05-08 | End: 2024-05-08

## 2024-05-08 RX ORDER — OXYCODONE HYDROCHLORIDE 5 MG/1
5 TABLET ORAL EVERY 4 HOURS PRN
Status: DISCONTINUED | OUTPATIENT
Start: 2024-05-08 | End: 2024-05-08 | Stop reason: HOSPADM

## 2024-05-08 RX ORDER — PHENAZOPYRIDINE HYDROCHLORIDE 200 MG/1
200 TABLET, FILM COATED ORAL 3 TIMES DAILY PRN
Qty: 10 TABLET | Refills: 0 | Status: SHIPPED | OUTPATIENT
Start: 2024-05-08 | End: 2024-05-11

## 2024-05-08 RX ORDER — ACETAMINOPHEN 325 MG/1
650 TABLET ORAL EVERY 4 HOURS PRN
Status: DISCONTINUED | OUTPATIENT
Start: 2024-05-08 | End: 2024-05-08 | Stop reason: HOSPADM

## 2024-05-08 RX ORDER — INSULIN LISPRO 100 [IU]/ML
2-12 INJECTION, SOLUTION INTRAVENOUS; SUBCUTANEOUS
Status: DISCONTINUED | OUTPATIENT
Start: 2024-05-08 | End: 2024-05-08 | Stop reason: HOSPADM

## 2024-05-08 RX ORDER — FENTANYL CITRATE/PF 50 MCG/ML
25 SYRINGE (ML) INJECTION
Status: DISCONTINUED | OUTPATIENT
Start: 2024-05-08 | End: 2024-05-08 | Stop reason: HOSPADM

## 2024-05-08 RX ORDER — INSULIN GLARGINE 100 [IU]/ML
15 INJECTION, SOLUTION SUBCUTANEOUS EVERY MORNING
Status: DISCONTINUED | OUTPATIENT
Start: 2024-05-08 | End: 2024-05-08

## 2024-05-08 RX ORDER — CEFAZOLIN SODIUM 1 G/3ML
INJECTION, POWDER, FOR SOLUTION INTRAMUSCULAR; INTRAVENOUS AS NEEDED
Status: DISCONTINUED | OUTPATIENT
Start: 2024-05-08 | End: 2024-05-08

## 2024-05-08 RX ORDER — MEPERIDINE HYDROCHLORIDE 25 MG/ML
12.5 INJECTION INTRAMUSCULAR; INTRAVENOUS; SUBCUTANEOUS
Status: DISCONTINUED | OUTPATIENT
Start: 2024-05-08 | End: 2024-05-08 | Stop reason: HOSPADM

## 2024-05-08 RX ORDER — ALBUTEROL SULFATE 90 UG/1
2 AEROSOL, METERED RESPIRATORY (INHALATION) EVERY 6 HOURS PRN
Status: DISCONTINUED | OUTPATIENT
Start: 2024-05-08 | End: 2024-05-08 | Stop reason: HOSPADM

## 2024-05-08 RX ORDER — ACETAMINOPHEN 325 MG/1
650 TABLET ORAL EVERY 4 HOURS PRN
Start: 2024-05-08

## 2024-05-08 RX ORDER — MAGNESIUM HYDROXIDE 1200 MG/15ML
LIQUID ORAL AS NEEDED
Status: DISCONTINUED | OUTPATIENT
Start: 2024-05-08 | End: 2024-05-08 | Stop reason: HOSPADM

## 2024-05-08 RX ORDER — TAMSULOSIN HYDROCHLORIDE 0.4 MG/1
0.4 CAPSULE ORAL
Qty: 15 CAPSULE | Refills: 0 | Status: SHIPPED | OUTPATIENT
Start: 2024-05-08

## 2024-05-08 RX ORDER — KETOROLAC TROMETHAMINE 30 MG/ML
15 INJECTION, SOLUTION INTRAMUSCULAR; INTRAVENOUS EVERY 6 HOURS SCHEDULED
Status: DISCONTINUED | OUTPATIENT
Start: 2024-05-08 | End: 2024-05-08 | Stop reason: HOSPADM

## 2024-05-08 RX ORDER — INSULIN GLARGINE 100 [IU]/ML
30 INJECTION, SOLUTION SUBCUTANEOUS EVERY MORNING
Status: DISCONTINUED | OUTPATIENT
Start: 2024-05-08 | End: 2024-05-08

## 2024-05-08 RX ORDER — DIPHENHYDRAMINE HCL 25 MG
25 TABLET ORAL EVERY 6 HOURS PRN
Status: DISCONTINUED | OUTPATIENT
Start: 2024-05-08 | End: 2024-05-08 | Stop reason: HOSPADM

## 2024-05-08 RX ORDER — DOCUSATE SODIUM 100 MG/1
100 CAPSULE, LIQUID FILLED ORAL 2 TIMES DAILY
Status: DISCONTINUED | OUTPATIENT
Start: 2024-05-08 | End: 2024-05-08 | Stop reason: HOSPADM

## 2024-05-08 RX ORDER — DOCUSATE SODIUM 100 MG/1
100 CAPSULE, LIQUID FILLED ORAL 2 TIMES DAILY
Qty: 30 CAPSULE | Refills: 0 | Status: SHIPPED | OUTPATIENT
Start: 2024-05-08 | End: 2024-05-23

## 2024-05-08 RX ORDER — INSULIN LISPRO 100 [IU]/ML
1-6 INJECTION, SOLUTION INTRAVENOUS; SUBCUTANEOUS
Status: DISCONTINUED | OUTPATIENT
Start: 2024-05-08 | End: 2024-05-08 | Stop reason: HOSPADM

## 2024-05-08 RX ORDER — KETOROLAC TROMETHAMINE 30 MG/ML
INJECTION, SOLUTION INTRAMUSCULAR; INTRAVENOUS AS NEEDED
Status: DISCONTINUED | OUTPATIENT
Start: 2024-05-08 | End: 2024-05-08

## 2024-05-08 RX ORDER — DEXAMETHASONE SODIUM PHOSPHATE 10 MG/ML
INJECTION, SOLUTION INTRAMUSCULAR; INTRAVENOUS AS NEEDED
Status: DISCONTINUED | OUTPATIENT
Start: 2024-05-08 | End: 2024-05-08

## 2024-05-08 RX ORDER — MORPHINE SULFATE 4 MG/ML
4 INJECTION, SOLUTION INTRAMUSCULAR; INTRAVENOUS EVERY 4 HOURS PRN
Status: DISCONTINUED | OUTPATIENT
Start: 2024-05-08 | End: 2024-05-08 | Stop reason: HOSPADM

## 2024-05-08 RX ORDER — CITALOPRAM 20 MG/1
20 TABLET ORAL DAILY
Status: DISCONTINUED | OUTPATIENT
Start: 2024-05-08 | End: 2024-05-08 | Stop reason: HOSPADM

## 2024-05-08 RX ORDER — PROMETHAZINE HYDROCHLORIDE 25 MG/ML
25 INJECTION, SOLUTION INTRAMUSCULAR; INTRAVENOUS ONCE AS NEEDED
Status: DISCONTINUED | OUTPATIENT
Start: 2024-05-08 | End: 2024-05-08 | Stop reason: HOSPADM

## 2024-05-08 RX ORDER — SODIUM CHLORIDE 9 MG/ML
50 INJECTION, SOLUTION INTRAVENOUS CONTINUOUS
Status: DISPENSED | OUTPATIENT
Start: 2024-05-08 | End: 2024-05-08

## 2024-05-08 RX ORDER — SODIUM CHLORIDE, SODIUM LACTATE, POTASSIUM CHLORIDE, CALCIUM CHLORIDE 600; 310; 30; 20 MG/100ML; MG/100ML; MG/100ML; MG/100ML
125 INJECTION, SOLUTION INTRAVENOUS CONTINUOUS
Status: DISCONTINUED | OUTPATIENT
Start: 2024-05-08 | End: 2024-05-08

## 2024-05-08 RX ORDER — ONDANSETRON 2 MG/ML
INJECTION INTRAMUSCULAR; INTRAVENOUS AS NEEDED
Status: DISCONTINUED | OUTPATIENT
Start: 2024-05-08 | End: 2024-05-08

## 2024-05-08 RX ORDER — TAMSULOSIN HYDROCHLORIDE 0.4 MG/1
0.4 CAPSULE ORAL
Status: DISCONTINUED | OUTPATIENT
Start: 2024-05-08 | End: 2024-05-08 | Stop reason: HOSPADM

## 2024-05-08 RX ORDER — NIFEDIPINE 30 MG/1
60 TABLET, EXTENDED RELEASE ORAL DAILY
Status: DISCONTINUED | OUTPATIENT
Start: 2024-05-08 | End: 2024-05-08 | Stop reason: HOSPADM

## 2024-05-08 RX ORDER — SODIUM CHLORIDE 9 MG/ML
150 INJECTION, SOLUTION INTRAVENOUS CONTINUOUS
Status: DISCONTINUED | OUTPATIENT
Start: 2024-05-08 | End: 2024-05-08

## 2024-05-08 RX ORDER — INSULIN LISPRO 100 [IU]/ML
5 INJECTION, SOLUTION INTRAVENOUS; SUBCUTANEOUS
Status: DISCONTINUED | OUTPATIENT
Start: 2024-05-08 | End: 2024-05-08 | Stop reason: HOSPADM

## 2024-05-08 RX ORDER — HYDROMORPHONE HCL/PF 1 MG/ML
0.5 SYRINGE (ML) INJECTION
Status: DISCONTINUED | OUTPATIENT
Start: 2024-05-08 | End: 2024-05-08 | Stop reason: HOSPADM

## 2024-05-08 RX ORDER — CEPHALEXIN 500 MG/1
500 CAPSULE ORAL EVERY 6 HOURS SCHEDULED
Qty: 3 CAPSULE | Refills: 0 | Status: SHIPPED | OUTPATIENT
Start: 2024-05-08 | End: 2024-05-09

## 2024-05-08 RX ORDER — SODIUM CHLORIDE, SODIUM LACTATE, POTASSIUM CHLORIDE, CALCIUM CHLORIDE 600; 310; 30; 20 MG/100ML; MG/100ML; MG/100ML; MG/100ML
INJECTION, SOLUTION INTRAVENOUS CONTINUOUS PRN
Status: DISCONTINUED | OUTPATIENT
Start: 2024-05-08 | End: 2024-05-08

## 2024-05-08 RX ORDER — NAPROXEN 500 MG/1
500 TABLET ORAL 2 TIMES DAILY WITH MEALS
Qty: 10 TABLET | Refills: 0 | Status: SHIPPED | OUTPATIENT
Start: 2024-05-08 | End: 2024-05-13

## 2024-05-08 RX ADMIN — KETOROLAC TROMETHAMINE 30 MG: 30 INJECTION, SOLUTION INTRAMUSCULAR; INTRAVENOUS at 14:54

## 2024-05-08 RX ADMIN — DOCUSATE SODIUM 100 MG: 100 CAPSULE, LIQUID FILLED ORAL at 08:19

## 2024-05-08 RX ADMIN — OXYCODONE HYDROCHLORIDE 10 MG: 10 TABLET ORAL at 08:19

## 2024-05-08 RX ADMIN — MORPHINE SULFATE 4 MG: 4 INJECTION, SOLUTION INTRAMUSCULAR; INTRAVENOUS at 04:22

## 2024-05-08 RX ADMIN — PROPOFOL 100 MG: 10 INJECTION, EMULSION INTRAVENOUS at 14:38

## 2024-05-08 RX ADMIN — DEXAMETHASONE SODIUM PHOSPHATE 10 MG: 10 INJECTION, SOLUTION INTRAMUSCULAR; INTRAVENOUS at 14:37

## 2024-05-08 RX ADMIN — FENTANYL CITRATE 50 MCG: 50 INJECTION INTRAMUSCULAR; INTRAVENOUS at 14:40

## 2024-05-08 RX ADMIN — CEFAZOLIN 3000 MG: 1 INJECTION, POWDER, FOR SOLUTION INTRAMUSCULAR; INTRAVENOUS at 14:37

## 2024-05-08 RX ADMIN — OXYCODONE HYDROCHLORIDE 10 MG: 10 TABLET ORAL at 01:12

## 2024-05-08 RX ADMIN — SODIUM CHLORIDE 150 ML/HR: 0.9 INJECTION, SOLUTION INTRAVENOUS at 01:33

## 2024-05-08 RX ADMIN — ONDANSETRON 4 MG: 2 INJECTION INTRAMUSCULAR; INTRAVENOUS at 14:37

## 2024-05-08 RX ADMIN — BUSPIRONE HYDROCHLORIDE 15 MG: 5 TABLET ORAL at 08:19

## 2024-05-08 RX ADMIN — LIDOCAINE HYDROCHLORIDE 50 MG: 10 INJECTION, SOLUTION EPIDURAL; INFILTRATION; INTRACAUDAL at 14:30

## 2024-05-08 RX ADMIN — KETOROLAC TROMETHAMINE 15 MG: 30 INJECTION, SOLUTION INTRAMUSCULAR; INTRAVENOUS at 06:26

## 2024-05-08 RX ADMIN — SODIUM CHLORIDE, SODIUM LACTATE, POTASSIUM CHLORIDE, AND CALCIUM CHLORIDE: .6; .31; .03; .02 INJECTION, SOLUTION INTRAVENOUS at 14:20

## 2024-05-08 RX ADMIN — CITALOPRAM HYDROBROMIDE 20 MG: 20 TABLET ORAL at 08:19

## 2024-05-08 RX ADMIN — PROPOFOL 300 MG: 10 INJECTION, EMULSION INTRAVENOUS at 14:30

## 2024-05-08 RX ADMIN — NIFEDIPINE 60 MG: 30 TABLET, FILM COATED, EXTENDED RELEASE ORAL at 08:19

## 2024-05-08 NOTE — CONSULTS
CONSULT    Patient Name: Sha Muñoz  Patient MRN: [id number]  Date of Service: 5/8/2024   Date / Time Note Created: 5/8/2024 8:37 AM   Referring Provider: Tavares Silva MD    Provider Creating Note: CHASIDY Cadet    PCP: [facility]  Attending Provider:  Tavares Silva MD    Reason for Consult: Flank Pain    HPI:  Sha Muñoz is a 28-year-old female presenting to [place] emergency room with chief complaint of right flank pain not accompanied by fever, chills, dysuria or gross hematuria.  CT demonstrated 6 mm proximal right ureteral calculus with associated hydronephrosis.  Although patient was afebrile and hemodynamically stable, she failed over 48 hours of medical expulsive therapy.  He is afebrile and hemodynamically stable and remains severely colicky.  She has normal renal function and white count.  Microscopic urine analysis was positive for hematuria but negative for pyuria and only occasional bacteria detected.    Urologic consultation requested for surgical management.       Active Problems:    Patient Active Problem List   Diagnosis    Asthma    Class 3 severe obesity due to excess calories without serious comorbidity with body mass index (BMI) of 50.0 to 59.9 in adult ([facility])    Hypertension, essential    GERD (gastroesophageal reflux disease)    Type 2 diabetes mellitus without complication, with long-term current use of insulin ([facility])    Calculus of ureterovesical junction (UVJ)            Impressions  Right ureteral Calculus  Hydronephrosis  Renal Colic    Recommendations  1.  Initiate aggressive IVFs   2. Flomax  3. Analgesia/Narcotics   4. Anti-emetics   5. ATBs empirically while awaiting culture   6. Strain urine   7. NPO  for OR if no spontaneous expulsion achieved.    Explained risk, benefits and potential complications of ureteroscopic stone extraction. Patient has verbalized understanding of possible need for ureteral stent only for any signs  of infection and/or technical difficult prohibiting stone retrieval etc.; requiring staged ureteroscopy electively once recovered and infection free as OP.  Formal consent by surgeon.        Past Medical History:   Diagnosis Date    Abnormal CT of the head     Analgesic overuse headache     Asthma     Chronic pain of both shoulders     Diabetes mellitus (HCC)     Hyperlipidemia     Hypertension     Obesity, morbid, BMI 50 or higher (HCC)     Urethral diverticulum        Past Surgical History:   Procedure Laterality Date     SECTION      x4    DILATION AND EVACUATION  2015    TUBAL LIGATION         Family History   Problem Relation Age of Onset    Hypertension Mother     Heart disease Mother     Diabetes Mother     Asthma Mother     Sleep apnea Father     Heart disease Father     Asthma Father     Heart failure Father     Asthma Sister     Asthma Brother     Asthma Brother     Sleep apnea Brother     Asthma Son     Asthma Son     Asthma Son     Rashes / Skin problems Son     Diabetes Maternal Grandmother     Hypertension Maternal Grandmother     Diabetes Maternal Grandfather     Cancer Maternal Aunt         ovarian    Hypertension Maternal Aunt     Arthritis Maternal Aunt     Cancer Paternal Aunt         ovarian    Heart disease Paternal Aunt     Diabetes Paternal Aunt        Social History     Socioeconomic History    Marital status: /Civil Union     Spouse name: Not on file    Number of children: Not on file    Years of education: Not on file    Highest education level: Not on file   Occupational History    Not on file   Tobacco Use    Smoking status: Former    Smokeless tobacco: Never   Vaping Use    Vaping status: Former   Substance and Sexual Activity    Alcohol use: Yes    Drug use: Not Currently     Types: Marijuana    Sexual activity: Not on file   Other Topics Concern    Not on file   Social History Narrative    Not on file     Social Determinants of Health     Financial Resource Strain:  Low Risk  (4/3/2024)    Received from [facility]    Overall Financial Resource Strain (CARDIA)     Difficulty of Paying Living Expenses: Not very hard   Food Insecurity: No Food Insecurity (5/6/2024)    Hunger Vital Sign     Worried About Running Out of Food in the Last Year: Never true     Ran Out of Food in the Last Year: Never true   Transportation Needs: No Transportation Needs (5/6/2024)    PRAPARE - Transportation     Lack of Transportation (Medical): No     Lack of Transportation (Non-Medical): No   Physical Activity: Not on file   Stress: Not on file   Social Connections: Not on file   Intimate Partner Violence: Not At Risk (4/3/2024)    Received from [facility]    Humiliation, Afraid, Rape, and Kick questionnaire     Fear of Current or Ex-Partner: No     Emotionally Abused: No     Physically Abused: No     Sexually Abused: No   Housing Stability: Low Risk  (5/6/2024)    Housing Stability Vital Sign     Unable to Pay for Housing in the Last Year: No     Number of Places Lived in the Last Year: 1     Unstable Housing in the Last Year: No       Allergies   Allergen Reactions    Pepper [Guggulipid-Black Pepper - Food Allergy] Shortness Of Breath    Metformin GI Intolerance    Cheese - Food Allergy Rash and Swelling     Swelling lips        Review of Systems  10 point review of systems negative except as noted in HPI  Constitutional:   negative for - chills or fever  Cardiovascular:   no chest pain or dyspnea on exertion  Gastrointestinal:   positive for - abdominal pain and appetite loss  Genito-Urinary:   no dysuria, trouble voiding, or hematuria  Neurological:   no TIA or stroke symptoms     Chart Review   Allergies, current medications, history, problem list    Vital Signs  /57 (BP Location: Right arm)   Pulse 58   Temp 98.2 °F (36.8 °C) (Oral)   Resp 18   LMP 04/16/2024   SpO2 96%     Physical Exam  General appearance: alert and oriented, in no acute distress,  appears stated age, cooperative, and no distress  Head: Normocephalic, without obvious abnormality, atraumatic  Neck: no JVD and supple, symmetrical, trachea midline  Lungs: clear to auscultation bilaterally  Heart: regular rate and rhythm, S1, S2 normal, no murmur, click, rub or gallop  Abdomen: abnormal findings:  mild tenderness in the lower abdomen  Extremities: extremities normal, warm and well-perfused; no cyanosis, clubbing, or edema  Pulses: 2+ and symmetric  Neurologic: Grossly normal  No urinary drains     Laboratory Studies  Lab Results   Component Value Date    HGBA1C 6.8 (H) 05/06/2024    HGBA1C 12.3 (H) 10/13/2023     09/14/2014    K 3.5 05/08/2024    K 3.7 04/03/2024     (H) 05/08/2024     04/03/2024    CO2 21 05/08/2024    CO2 26 04/03/2024    GLUCOSE 100 09/14/2014    CREATININE 0.81 05/08/2024    CREATININE 0.85 04/03/2024    BUN 14 05/08/2024    BUN 13 04/03/2024    MG 1.9 05/07/2024    PHOS 4.0 05/07/2024     Lab Results   Component Value Date    WBC 7.27 05/08/2024    WBC 8.93 09/14/2014    RBC 3.59 (L) 05/08/2024    RBC 3.93 09/14/2014    HGB 9.7 (L) 05/08/2024    HGB 10.7 (L) 09/14/2014    HGB 11.2 (L) 09/14/2014    HCT 31.4 (L) 05/08/2024    HCT 33.2 (L) 09/14/2014    MCV 88 05/08/2024    MCV 85 09/14/2014    MCH 27.0 05/08/2024    MCH 27.2 09/14/2014    RDW 13.9 05/08/2024    RDW 13.6 09/14/2014     05/08/2024     09/14/2014         Imaging and Other Studies  ).CT renal stone study abdomen pelvis wo contrast    Result Date: 5/6/2024  Narrative: CT ABDOMEN AND PELVIS WITHOUT IV CONTRAST - LOW DOSE RENAL STONE INDICATION: RUQ and R flank pain. COMPARISON: CT abdomen and pelvis on 5/5/2011. TECHNIQUE: Low radiation dose thin section CT examination of the abdomen and pelvis was performed without intravenous or oral contrast according to a protocol specifically designed to evaluate for urinary tract calculus. Axial, sagittal, and coronal 2D reformatted images were  created from the source data and submitted for interpretation. Evaluation for pathology in the abdomen and pelvis that is unrelated to urinary tract calculi is limited. Radiation dose length product (DLP) for this visit: 591 mGy-cm . This examination, like all CT scans performed in the [facility], was performed utilizing techniques to minimize radiation dose exposure, including the use of iterative reconstruction and automated exposure control. URINARY TRACT FINDINGS: RIGHT KIDNEY AND URETER: 6 mm calculus at the right ureterovesical junction. There is associated moderate hydronephrosis and moderate to severe hydroureter. Mild perinephric stranding and diffuse periureteral fat stranding. Multiple punctate nonobstructing intrarenal calculi. LEFT KIDNEY AND URETER: Multiple punctate nonobstructing intrarenal calculi. No hydronephrosis or hydroureter. URINARY BLADDER: Partially filled. ADDITIONAL FINDINGS: LOWER CHEST: No clinically significant abnormality in the visualized lower chest. SOLID VISCERA: Enlarged fatty liver. Otherwise, limited low radiation dose CT demonstrates no clinically significant abnormality of visualized solid abdominal viscera. GALLBLADDER/BILIARY TREE: No calcified gallstones. No pericholecystic inflammatory change. No biliary dilation. STOMACH AND BOWEL: Unremarkable. APPENDIX: Normal. ABDOMINOPELVIC CAVITY: No ascites. No pneumoperitoneum. No lymphadenopathy. VESSELS: Unremarkable for patient's age. REPRODUCTIVE ORGANS: Unremarkable for patient's age. ABDOMINAL WALL/INGUINAL REGIONS: Small fat-containing umbilical hernia. BONES: No acute fracture or suspicious osseous lesion. Bilateral L4 and L5 pars defects without significant spondylolisthesis.     Impression: 6 mm calculus at the right ureterovesical junction causing moderate hydronephrosis and moderate to severe hydroureter. Multiple nonobstructing bilateral renal calculi. Hepatomegaly and hepatic steatosis. The study was  marked in EPIC for immediate notification. Workstation performed: [id number]       Medications   Current Facility-Administered Medications   Medication Dose Route Frequency Provider Last Rate    acetaminophen  650 mg Oral Q4H PRN Dione No MD      albuterol  2 puff Inhalation Q6H PRN Dione No MD      busPIRone  15 mg Oral BID Dione No MD      citalopram  20 mg Oral Daily Dione No MD      diphenhydrAMINE  25 mg Oral Q6H PRN Dione No MD      docusate sodium  100 mg Oral BID Dione No MD      doxepin  75 mg Oral HS Dione No MD      famotidine  20 mg Oral HS Dione No MD      insulin glargine  15 Units Subcutaneous QAM Dawood Braxton MD      insulin lispro  1-6 Units Subcutaneous HS Dione No MD      insulin lispro  2-12 Units Subcutaneous TID AC Dione No MD      insulin lispro  5 Units Subcutaneous TID With Meals Dione No MD      ketorolac  15 mg Intravenous Q6H DENY Dione No MD      morphine injection  4 mg Intravenous Q4H PRN Dione No MD      NIFEdipine  60 mg Oral Daily Dione No MD      ondansetron  4 mg Intravenous Q6H PRN Dione No MD      oxyCODONE  10 mg Oral Q4H PRN Dione No MD      oxyCODONE  5 mg Oral Q4H PRN Dione No MD      sodium chloride  150 mL/hr Intravenous Continuous Dione No  mL/hr (05/08/24 0133)    tamsulosin  0.4 mg Oral Daily With Dinner MD Verónica Karimi CRNP

## 2024-05-08 NOTE — PROGRESS NOTES
"    INTERNAL MEDICINE RESIDENCY PROGRESS NOTE     Name: Sha Muñoz   Age & Sex: 28 y.o. female   MRN: [id number]  Unit/Bed#: NW8 874-01   Encounter: [id number]  Team: SOD Team B     PATIENT INFORMATION     Name: Sha Muñoz   Age & Sex: 28 y.o. female   MRN: [id number]  Hospital Stay Days: 0    ASSESSMENT/PLAN     Principal Problem:    Calculus of ureterovesical junction (UVJ)  Active Problems:    Asthma    Class 3 severe obesity due to excess calories without serious comorbidity with body mass index (BMI) of 50.0 to 59.9 in adult ([facility])    Hypertension, essential    GERD (gastroesophageal reflux disease)    Type 2 diabetes mellitus without complication, with long-term current use of insulin ([facility])      * Calculus of ureterovesical junction (UVJ)  Assessment & Plan  Patient initially presented to the ED with complaints of 2 days of right-sided flank pain.  Other associated symptoms included nausea/vomiting as well as urinary frequency though no virgil hematuria or urgency.  Patient does have a history of kidney stones with similar presentations in the past.      Initial lab studies notable for stable renal function, mild leukocytosis (12.25), UA with occult blood and 20-30 RBCs on microscopy    CT stone protocol: \"6 mm calculus at the right ureterovesical junction causing moderate hydronephrosis and moderate to severe hydroureter. Multiple nonobstructing bilateral renal calculi. Hepatomegaly and hepatic steatosis.\"    After discussion with urology, plan was initially to try and manage the patient conservatively with expulsive therapy and symptomatic control.  Unfortunately, her pain has persisted and she has not yet passed her stone.  Case was rediscussed with urology today and decision was made to transfer the patient to [facility] for operative intervention    Plan  Urology consulted, recommendations appreciated  NPO for potential surgical intervention this afternoon  Pain regimen: oxy 5/10, morphine 4 " mg; titrate as needed  Zofran PRN for nausea control  Continue Flomax 0.4 mg daily  Continue to strain urine for stone passage  Continue IV hydration    Type 2 diabetes mellitus without complication, with long-term current use of insulin ([facility])  Assessment & Plan  Lab Results   Component Value Date    HGBA1C 6.8 (H) 2024       Recent Labs     24  2215 24  0750 24  1058   POCGLU 101 88 84 84       Blood Sugar Average: Last 72 hrs:  (P) 84  Patient with history of T2DM with insulin usage: home regimen includes Lantus 30 units daily. Glucose has bene overall tightly controlled at 80s-100s since admission on home dose lantus    Plan  Hold lantus this morning since NPO   Administer reduced dose lantus 25 units tonight if done with procedure and not NPO anymore  Restart meal-time whenever has diet order  Continue sliding scale coverage    GERD (gastroesophageal reflux disease)  Assessment & Plan  Patient with history of GERD, treated with Pepcid 20 mg daily in the outpatient setting. Stable at present.    Plan  Continue PTA famotidine    Hypertension, essential  Assessment & Plan  Patient with history of hypertension, treated with Procardia 60 mg daily. Does also appear to have been taking Cozaar 25 mg daily and propranolol 80 mg daily though these latter two scripts are . Initially noted to be hypertensive in the ED to 180s (likely in setting of acute pain), though has since been better controlled with pressures ranging from 100-140s.     Plan  For now, continue Procardia only  Pending clinical course, consider resumption of other agents as needed    Class 3 severe obesity due to excess calories without serious comorbidity with body mass index (BMI) of 50.0 to 59.9 in adult (HCC)  Assessment & Plan  History noted. Also noted to have hepatomegaly and hepatic steatosis on recent CT imaging, as well as significantly elevated BMI.    Plan  Recommend close outpatient follow-up and  lifestyle modification on discharge  Consider weight management referral on discharge    Asthma  Assessment & Plan  Patient with reported history of mild intermittent asthma without complication, treated with fluticasone and albuterol rescue inhaler in the outpatient setting. Respiratory status overall stable since admission.    Unremarkable PFT in 2024  Currently not in exacerbation    Plan  Continue home inhaler regimen        Disposition: Inpatient level care pending cystoscopy     SUBJECTIVE     Patient seen and examined. No acute events overnight. Patient mentions continued 7/10 right flank pain. No associated fever, chills, burning micturition, abdominal pain, nausea, vomiting, shortness of breath. Adequate urine output and bowel movements. NPO for procedure.     OBJECTIVE     Vitals:    24 0036 24 0739   BP: 126/75 115/57   BP Location: Right arm Right arm   Pulse: 69 58   Resp: 16 18   Temp: 97.5 °F (36.4 °C) 98.2 °F (36.8 °C)   TempSrc: Oral Oral   SpO2: 98% 96%      Temperature:   Temp (24hrs), Av.1 °F (36.7 °C), Min:97.3 °F (36.3 °C), Max:98.7 °F (37.1 °C)    Temperature: 98.2 °F (36.8 °C)  Intake & Output:  I/O         05/06 0701  05/07 0700 / 0701  / 0700 / 0701  / 0700           Unmeasured Urine Occurrence  1 x           Weights:        There is no height or weight on file to calculate BMI.  Weight (last 2 days)       None          Physical Exam  Vitals and nursing note reviewed.   Constitutional:       General: She is not in acute distress.     Appearance: She is well-developed.   HENT:      Head: Normocephalic and atraumatic.   Eyes:      Conjunctiva/sclera: Conjunctivae normal.   Cardiovascular:      Rate and Rhythm: Normal rate and regular rhythm.      Heart sounds: No murmur heard.  Pulmonary:      Effort: Pulmonary effort is normal. No respiratory distress.      Breath sounds: Normal breath sounds.   Abdominal:      Palpations: Abdomen is soft.       Tenderness: There is no abdominal tenderness. There is right CVA tenderness.   Musculoskeletal:         General: No swelling.      Cervical back: Neck supple.   Skin:     General: Skin is warm and dry.      Capillary Refill: Capillary refill takes less than 2 seconds.   Neurological:      General: No focal deficit present.      Mental Status: She is alert and oriented to person, place, and time.   Psychiatric:         Mood and Affect: Mood normal.       LABORATORY DATA     Labs: I have personally reviewed pertinent reports.  Results from last 7 days   Lab Units 05/08/24  0500 05/07/24  0544 05/06/24  0537 05/06/24  0009   WBC Thousand/uL 7.27 9.48 12.08* 12.25*   HEMOGLOBIN g/dL 9.7* 9.5* 10.9* 11.4*   HEMATOCRIT % 31.4* 33.0* 36.6 37.5   PLATELETS Thousands/uL 199 189 232 234   SEGS PCT % 59 66  --  74   MONO PCT % 4 5  --  5   EOS PCT % 4 3  --  1      Results from last 7 days   Lab Units 05/08/24  0500 05/07/24  0544 05/06/24  0537 05/06/24  0009   POTASSIUM mmol/L 3.5 3.3* 3.4* 3.6   CHLORIDE mmol/L 111* 110* 108 106   CO2 mmol/L 21 21 22 23   BUN mg/dL 14 20 20 22   CREATININE mg/dL 0.81 1.08 0.97 0.97   CALCIUM mg/dL 8.1* 8.2* 8.6 9.3   ALK PHOS U/L  --   --   --  94   ALT U/L  --   --   --  56*   AST U/L  --   --   --  26     Results from last 7 days   Lab Units 05/07/24  0544   MAGNESIUM mg/dL 1.9     Results from last 7 days   Lab Units 05/07/24  0544   PHOSPHORUS mg/dL 4.0                    IMAGING & DIAGNOSTIC TESTING     Radiology Results: I have personally reviewed pertinent reports.  No results found.  Other Diagnostic Testing: I have personally reviewed pertinent reports.    ACTIVE MEDICATIONS     Current Facility-Administered Medications   Medication Dose Route Frequency    acetaminophen (TYLENOL) tablet 650 mg  650 mg Oral Q4H PRN    albuterol (PROVENTIL HFA,VENTOLIN HFA) inhaler 2 puff  2 puff Inhalation Q6H PRN    busPIRone (BUSPAR) tablet 15 mg  15 mg Oral BID    citalopram (CeleXA) tablet 20 mg  " 20 mg Oral Daily    diphenhydrAMINE (BENADRYL) tablet 25 mg  25 mg Oral Q6H PRN    docusate sodium (COLACE) capsule 100 mg  100 mg Oral BID    doxepin (SINEquan) capsule 75 mg  75 mg Oral HS    famotidine (PEPCID) tablet 20 mg  20 mg Oral HS    insulin lispro (HumALOG/ADMELOG) 100 units/mL subcutaneous injection 1-6 Units  1-6 Units Subcutaneous HS    insulin lispro (HumALOG/ADMELOG) 100 units/mL subcutaneous injection 2-12 Units  2-12 Units Subcutaneous TID AC    insulin lispro (HumALOG/ADMELOG) 100 units/mL subcutaneous injection 5 Units  5 Units Subcutaneous TID With Meals    ketorolac (TORADOL) injection 15 mg  15 mg Intravenous Q6H DENY    morphine injection 4 mg  4 mg Intravenous Q4H PRN    NIFEdipine (PROCARDIA XL) 24 hr tablet 60 mg  60 mg Oral Daily    ondansetron (ZOFRAN) injection 4 mg  4 mg Intravenous Q6H PRN    oxyCODONE (ROXICODONE) immediate release tablet 10 mg  10 mg Oral Q4H PRN    oxyCODONE (ROXICODONE) IR tablet 5 mg  5 mg Oral Q4H PRN    sodium chloride 0.9 % infusion  150 mL/hr Intravenous Continuous    tamsulosin (FLOMAX) capsule 0.4 mg  0.4 mg Oral Daily With Dinner       VTE Pharmacologic Prophylaxis: Reason for no pharmacologic prophylaxis low risk  VTE Mechanical Prophylaxis: sequential compression device    Portions of the record may have been created with voice recognition software.  Occasional wrong word or \"sound a like\" substitutions may have occurred due to the inherent limitations of voice recognition software.  Read the chart carefully and recognize, using context, where substitutions have occurred.  ==  Dawodo Braxton MD  [facility]  Internal Medicine Residency PGY-2       "

## 2024-05-08 NOTE — NURSING NOTE
Pt discharged with belongings to [facility].  Report given to RN prior to discharge.  Pt AAO and is no distress.

## 2024-05-08 NOTE — ANESTHESIA PREPROCEDURE EVALUATION
Procedure:  CYSTOSCOPY URETEROSCOPY WITH LITHOTRIPSY HOLMIUM LASER, RETROGRADE PYELOGRAM AND INSERTION STENT URETERAL (Right: Bladder)    Relevant Problems   CARDIO   (+) Chest pain   (+) Hypertension, essential      ENDO   (+) Type 2 diabetes mellitus without complication, with long-term current use of insulin (HCC)      GI/HEPATIC   (+) GERD (gastroesophageal reflux disease)      PULMONARY   (+) Asthma        Physical Exam    Airway    Mallampati score: III  TM Distance: >3 FB  Neck ROM: full     Dental   No notable dental hx     Cardiovascular      Pulmonary      Other Findings  post-pubertal.      Anesthesia Plan  ASA Score- 2     Anesthesia Type- general with ASA Monitors.         Additional Monitors:     Airway Plan: LMA.    Comment: Interpretor: Johnathan Logan #[phone].       Plan Factors-Exercise tolerance (METS): >4 METS.    Chart reviewed. EKG reviewed.  Existing labs reviewed. Patient summary reviewed.    Patient is not a current smoker.              Induction- intravenous.    Postoperative Plan- Plan for postoperative opioid use.     Informed Consent- Anesthetic plan and risks discussed with patient.  I personally reviewed this patient with the CRNA. Discussed and agreed on the Anesthesia Plan with the CRNA..

## 2024-05-08 NOTE — ANESTHESIA POSTPROCEDURE EVALUATION
Post-Op Assessment Note    CV Status:  Stable    Pain management: adequate       Mental Status:  Alert and awake   Hydration Status:  Euvolemic   PONV Controlled:  Controlled   Airway Patency:  Patent     Post Op Vitals Reviewed: Yes    No anethesia notable event occurred.    Staff: Anesthesiologist               BP   108/60   Temp   97.3   Pulse  75   Resp   12   SpO2   99%

## 2024-05-08 NOTE — QUICK NOTE
Patient's spouse geetha at bedside updated about patient's current condition and further plan of evaluation and management. All questions and concerns addressed.     Dawood Braxton MD  PGY-2, Internal Medicine  [facility]

## 2024-05-08 NOTE — OP NOTE
Operative Note     PATIENT:  Sha Muñoz (MRN [id number])    DATE OF PROCEDURE:   5/8/2024    PRE-OP DIAGNOSIS:   1) Right ureteral calculus    POST-OP DIAGNOSIS:   1) Right ureteral calculus    PROCEDURES PERFORMED:  1) Cystoscopy  2) Right retrograde pyelography with fluoroscopic interpretation  3) Right ureteroscopy (diagnostic)  4) Right ureteral stent placement    SURGEON:  Zac Sow MD    NOTE:  There were no qualified teaching residents to assist with this case    ANESTHESIA: General     COMPLICATIONS:   None    ANTIBIOTICS:  Ancef    INTRAOPERATIVE THROMBOEMBOLISM PROPHYLAXIS:  Pneumatic compression stockings     FINDINGS:  The right ureteral orifice was erythematous consistent with prior stone passage.  No stone was visible on fluoroscopy a retrograde pyelogram.  Due to the patient's continued pain I did perform ureteroscopy which was also consistent with complete passage of the ureteral stone.  The entire length of the ureter from the UVJ to the UPJ was evaluated and no stone was present.  A postop stent was left in place.    INDICATIONS FOR PROCEDURE:  Sha Muñoz is an 28 y.o. old female with Right ureteral nephrolithiasis.  After discussing the options, the patient elected to undergo ureteroscopy and ureteral stent placement.  We discussed the procedure in detail, the alternatives, and the risks, and they signed informed consent to proceed.    PROCEDURE IN DETAIL:     PROCEDURE IN DETAIL:   The patient was identified and brought to the OR.  Antibiotic prophylaxis and DVT prophylaxis were administered.  They were placed in the comfortable dorsal lithotomy position with care to pad all pressure points.  They were prepped and draped in the usual sterile fashion using hibiclens.  A surgical time out was performed with all in the room in agreement with the correct patient, procedure, indications, and laterality.  A 21-Lithuanian rigid cystoscope was used to enter the bladder.  The  bladder was inspected in its entirety and there were no lesions noted.  The ureteral orifices were identified in their normal orthotopic positions.     The right ureteral orifice was identified and a 5 Fr open ended catheter was placed into the ureteral orifice  .   A retrograde pyelogram was performed with the findings as described above.  A Sensor wire was advanced up to the kidney under fluoroscopic guidance.  Leaving this safety wire in place, the bladder was drained.       A   7.5 Spanish semi-rigid ureteroscope was advanced up the ureter under vision .     The entire length of the ureter was evaluated from the UVJ to the UPJ.  Findings were consistent with successful incomplete passage of the patient's stone.  No residual stone was present.     The ureteroscope was backed down the ureter under vision and there were no residual fragments and the ureter was noted to be intact with no injury and mild edema where the stone was located.   A JJ stent was then passed up the wire  under fluoroscopic guidance into the kidney with a good curl noted in the kidney and in the bladder.  An externalized tethering string was left in place and secured to the skin. The bladder was drained.      The patient was placed back supine, awakened from general anesthesia and brought to recovery room in stable condition.      ESTIMATED BLOOD LOSS:  Minimal      SPECIMENS:   No specimens collected during this procedure.     IMPLANTS:   Implant Name Type Inv. Item Serial No.  Lot No. LRB No. Used Action   STENT URETERAL 6FR 24CML INLAY OPTIMA - [id number]  STENT URETERAL 6FR 24CML INLAY OPTIMA  [employer] [id number] 1 Implanted        COMPLICATIONS: None    DISPOSITION: PACU    PLAN:  The patient was instructed to remove their stent on postoperative day 4. They will then follow up in 6 weeks with a postoperative KUB and renal ultrasound.

## 2024-05-08 NOTE — CASE MANAGEMENT
Case Management Assessment & Discharge Planning Note    Patient name Sha Muñoz  Location 8 874/8 [id number] MRN [id number]  : 1995 Date 2024       Current Admission Date: 2024  Current Admission Diagnosis:Calculus of ureterovesical junction (UVJ)   Patient Active Problem List    Diagnosis Date Noted    Chest pain 2024    Calculus of ureterovesical junction (UVJ) 2024    Type 2 diabetes mellitus without complication, with long-term current use of insulin ([facility]) 2024    GERD (gastroesophageal reflux disease) 2022    Class 3 severe obesity due to excess calories without serious comorbidity with body mass index (BMI) of 50.0 to 59.9 in adult ([facility]) 2019    Hypertension, essential 2019    Asthma 2013      LOS (days): 0  Geometric Mean LOS (GMLOS) (days):   Days to GMLOS:     OBJECTIVE:    Risk of Unplanned Readmission Score: 11.09         Current admission status: Inpatient       Preferred Pharmacy:   [id number] [address]  [address] [facility] [address] [address]  Phone: [phone] Fax: [phone]    Primary Care Provider: [facility]    Primary Insurance: [employer] [facility]  Secondary Insurance:     ASSESSMENT:  Active Health Care Proxies    There are no active Health Care Proxies on file.       Advance Directives  Does patient have a Health Care POA?: No  Does patient have Advance Directives?: No  Primary Contact: Freddie Ahn (Spouse)    Readmission Root Cause  30 Day Readmission: No    Patient Information  Admitted from:: Home  Mental Status: Alert  During Assessment patient was accompanied by: Spouse, Other-Comment  Assessment information provided by:: Patient, Spouse  Primary Caregiver: Self  Support Systems: Self, Spouse/significant other  County of Residence: [place]  What city do you live in?: [place]  Home entry access options. Select all that apply.:  Stairs  Number of steps to enter home.: 4  Do the steps have railings?: Yes  Type of Current Residence: 3 story home  Upon entering residence, is there a bedroom on the main floor (no further steps)?: No  A bedroom is located on the following floor levels of residence (select all that apply):: 2nd Floor  Upon entering residence, is there a bathroom on the main floor (no further steps)?: No  Indicate which floors of current residence have a bathroom (select all the apply):: 2nd Floor  Number of steps to 2nd floor from main floor: One Flight  Living Arrangements: Lives w/ Spouse/significant other, Other (Comment) (lives with spouse and 4 children)  Is patient a ?: No    Activities of Daily Living Prior to Admission  Functional Status: Independent  Completes ADLs independently?: Yes  Ambulates independently?: Yes  Does patient use assisted devices?: No  Does patient currently own DME?: No  Does patient have a history of Outpatient Therapy (PT/OT)?: No  Does the patient have a history of Short-Term Rehab?: No  Does patient have a history of HHC?: No  Does patient currently have HHC?: No         Patient Information Continued  Income Source: Unemployed  Does patient have prescription coverage?: Yes  Does patient receive dialysis treatments?: No  Does patient have a history of substance abuse?: No  Does patient have a history of Mental Health Diagnosis?: Yes (Anxiety and Depression)  Is patient receiving treatment for mental health?: Yes  Has patient received inpatient treatment related to mental health in the last 2 years?: No         Means of Transportation  Means of Transport to [facility] [place]:: Family transport      Social Determinants of Health (SDOH)      Flowsheet Row Most Recent Value   Housing Stability    In the last 12 months, was there a time when you were not able to pay the mortgage or rent on time? N   In the last 12 months, how many places have you lived? 1   In the last 12 months, was there a time when you  did not have a steady place to sleep or slept in a shelter (including now)? N   Transportation Needs    In the past 12 months, has lack of transportation kept you from medical appointments or from getting medications? no   In the past 12 months, has lack of transportation kept you from meetings, work, or from getting things needed for daily living? No   Food Insecurity    Within the past 12 months, you worried that your food would run out before you got the money to buy more. Never true   Within the past 12 months, the food you bought just didn't last and you didn't have money to get more. Never true   Utilities    In the past 12 months has the electric, gas, oil, or water company threatened to shut off services in your home? No            DISCHARGE DETAILS:    Discharge planning discussed with:: patient, spouse and mother at bedside  Freedom of Choice: Yes  Comments - Freedom of Choice: pending any recs  CM contacted family/caregiver?: Yes  Were Treatment Team discharge recommendations reviewed with patient/caregiver?: Yes  Did patient/caregiver verbalize understanding of patient care needs?: Yes  Were patient/caregiver advised of the risks associated with not following Treatment Team discharge recommendations?: Yes    Contacts  Patient Contacts: Freddie Ahn (Spouse)  Relationship to Patient:: Family  Contact Method: Phone, In Person  Phone Number: [phone] (Y)  Reason/Outcome: Continuity of Care, Emergency Contact, Discharge Planning     ..CM reviewed d/c planning process including the following: identifying help at home, patient preference for d/c planning needs, Discharge Lounge, [facility] Meds to Bed program, availability of treatment team to discuss questions or concerns patient and/or family may have regarding understanding medications and recognizing signs and symptoms once discharged.  CM also encouraged patient to follow up with all recommended appointments after discharge. Patient advised of  importance for patient and family to participate in managing patient’s medical well being.

## 2024-05-08 NOTE — DISCHARGE INSTR - AVS FIRST PAGE
Remove stent on post op day four and follow up in the Urology office in 6 weeks with KUB and US, office will be reaching out to schedule appointment

## 2024-05-09 PROBLEM — R07.89 MUSCULOSKELETAL CHEST PAIN: Status: ACTIVE | Noted: 2024-05-08

## 2024-05-09 NOTE — DISCHARGE SUMMARY
"      INTERNAL MEDICINE RESIDENCY DISCHARGE SUMMARY     Sha Muñoz   28 y.o. female  MRN: [id number]  Room/Bed: [facility]4/[facility] [address] [place] [address]    Encounter: [id number]    Principal Problem:    Calculus of ureterovesical junction (UVJ)  Active Problems:    Musculoskeletal chest pain    Asthma    Class 3 severe obesity due to excess calories without serious comorbidity with body mass index (BMI) of 50.0 to 59.9 in adult ([facility])    Hypertension, essential    GERD (gastroesophageal reflux disease)    Type 2 diabetes mellitus without complication, with long-term current use of insulin ([facility])      * Calculus of ureterovesical junction (UVJ)  Assessment & Plan  Patient initially presented to the ED with complaints of 2 days of right-sided flank pain.  Other associated symptoms included nausea/vomiting as well as urinary frequency though no virgil hematuria or urgency.  Patient does have a history of kidney stones with similar presentations in the past.      Initial lab studies notable for stable renal function, mild leukocytosis (12.25), UA with occult blood and 20-30 RBCs on microscopy    CT stone protocol: \"6 mm calculus at the right ureterovesical junction causing moderate hydronephrosis and moderate to severe hydroureter. Multiple nonobstructing bilateral renal calculi. Hepatomegaly and hepatic steatosis.\"    After discussion with urology, plan was initially to try and manage the patient conservatively with expulsive therapy and symptomatic control.  Unfortunately, her pain has persisted and she has not yet passed her stone.  Case was rediscussed with urology and decision was made to transfer the patient to [facility] for operative intervention    Patient underwent cystoscopy with pyelogram and no stone was seen but findings of passed stone were noted  Patient tolerated the procedure well without any complications    Plan  Outpatient urology and PCP follow up    Musculoskeletal " chest pain  Assessment & Plan  Patient on admission mentioned intermittent chest pain since last 2 weeks since moving furniture    Pain was reproducible on chest palpation    No clinical signs concerning for cardiac origin of chest pain    Plan  Topical analgesics as needed    Type 2 diabetes mellitus without complication, with long-term current use of insulin ([facility])  Assessment & Plan  Lab Results   Component Value Date    HGBA1C 6.8 (H) 2024       Recent Labs     24  0750 24  1058 24  1506 24  1628   POCGLU 84 84 83 146*       Blood Sugar Average: Last 72 hrs:  (P) 99.25  Patient with history of T2DM with insulin usage: home regimen includes Lantus 30 units daily and insulin lispro 5 units TID    Plan  Continue home regimen on discharge  PCP follow up    GERD (gastroesophageal reflux disease)  Assessment & Plan  Patient with history of GERD, treated with Pepcid 20 mg daily in the outpatient setting. Stable at present.    Plan  Continue PTA famotidine    Hypertension, essential  Assessment & Plan  Patient with history of hypertension, treated with Procardia 60 mg daily. Does also appear to have been taking Cozaar 25 mg daily and propranolol 80 mg daily though these latter two scripts are . Initially noted to be hypertensive in the ED to 180s (likely in setting of acute pain), though has since been better controlled with pressures ranging from 100-140s.     Plan  Only Procardia continued while admission with normal blood pressures  Follow up with PCP in 1-2 weeks     Class 3 severe obesity due to excess calories without serious comorbidity with body mass index (BMI) of 50.0 to 59.9 in adult ([facility]CC)  Assessment & Plan  History noted. Also noted to have hepatomegaly and hepatic steatosis on recent CT imaging, as well as significantly elevated BMI.    Plan  Recommend close outpatient follow-up and lifestyle modification on discharge  Consider weight management referral on  "discharge    Asthma  Assessment & Plan  Patient with reported history of mild intermittent asthma without complication, treated with fluticasone and albuterol rescue inhaler in the outpatient setting. Respiratory status overall stable since admission.    Unremarkable PFT in February 2024  Currently not in exacerbation    Plan  Continue home inhaler regimen  PCP follow up        DETAILS OF HOSPITAL COURSE     As per HPI by Dr Ramirez, \"Patient is a 28 y.o. F with PMH notable for T2DM not on insulin therapy, HLD, HTN, asthma, and morbid obesity who originally presented to [facility] with complaints of 2 days of right-sided flank pain. She did endorse a history of nephrolithiasis with similar presentations in the past. Prior to her arrival at the hospital, she attempted symptomatic control with Tylenol without significant relief. Other associated symptoms at that time included nausea/vomiting as well as urinary frequency (though patient denied any hematuria or urgency).     On initial arrival in the ED, patient's vitals included the following: temp 97.5, HR 76, RR 18, /98, 95% on RA. Initial labs showed a mild leukocytosis (12.25), mild stable anemia (11.4), glucose 162, mildly elevated ALT (56) with normal LFTs otherwise. Lipase and pregnancy testing unremarkable. UA notable for occult blood with 20-30 RBCs seen on microscopy; no virgil evidence of infection otherwise. CT A/P showed a 6 mm stone at the right UPJ with moderate hydronephrosis with moderate/severe hydroureter. Also noted was hepatomegaly and hepatic steatosis. The patient's case was discussed with Urology by the ED team, who originally elected to pursue admission for ongoing pain/symptom control as well as conservative expulsive therapy with hydration and Flomax. However, they did note that the patient may require transfer for operative intervention should her pain remain unresolved or her clinical status worsen. Despite conservative management, the patient's " "symptoms have persisted and thus the decision was made to transfer her to [facility] for operative Urology intervention, tentatively scheduled for 5/8.     Patient subsequently seen and examined on arrival at [facility]. At this time, she reports right flank pain radiating to groin. She also endorses reproducible chest pain with palpation. She denies any fevers, chills, shortness of breath, cough, dysuria, urgency, hematuria. \"    Patient underwent cystoscopy with pyelography which did not show any signs of a stone but did show erythema consistent with passed stone, a post-op stent was placed. She tolerated the procedure well without any acute complications. Post-procedure patient was found to be stable to be discharged home with outpatient urology follow up.    DISCHARGE INFORMATION     PCP at Discharge: Samantha Schofield    Admitting Provider: Tavares Silva MD  Admission Date: 5/8/2024    Discharge Provider: Tavares Silva MD  Discharge Date: 05/08/2024    Discharge Disposition: Home/Self Care  Discharge Condition: stable  Discharge with Lines: no    Discharge Diet: regular diet  Activity Restrictions: none  Test Results Pending at Discharge: none    Discharge Diagnoses:  Principal Problem:    Calculus of ureterovesical junction (UVJ)  Active Problems:    Musculoskeletal chest pain    Asthma    Class 3 severe obesity due to excess calories without serious comorbidity with body mass index (BMI) of 50.0 to 59.9 in adult ([facility])    Hypertension, essential    GERD (gastroesophageal reflux disease)    Type 2 diabetes mellitus without complication, with long-term current use of insulin ([facility])  Resolved Problems:    * No resolved hospital problems. *      Consulting Providers:      Diagnostic & Therapeutic Procedures Performed:  FL retrograde pyelogram    Result Date: 5/9/2024  Impression: Fluoroscopic guidance provided for right retrograde pyelogram.  Please refer to the separate procedure notes for additional details. Workstation " performed: [id number]       Code Status: Prior  Advance Directive & Living Will: <no information>  Power of :    POLST:      Medications:  Discharge Medication List as of 5/8/2024  5:24 PM        Discharge Medication List as of 5/8/2024  5:24 PM        START taking these medications    Details   !! acetaminophen (TYLENOL) 325 mg tablet Take 2 tablets (650 mg total) by mouth every 4 (four) hours as needed for mild pain, Starting Wed 5/8/2024, No Print      cephalexin (KEFLEX) 500 mg capsule Take 1 capsule (500 mg total) by mouth every 6 (six) hours for 3 doses Start the morning of your stent removal, Starting Wed 5/8/2024, Until Thu 5/9/2024, Normal      docusate sodium (COLACE) 100 mg capsule Take 1 capsule (100 mg total) by mouth 2 (two) times a day for 15 days, Starting Wed 5/8/2024, Until Thu 5/23/2024, Normal      naproxen (NAPROSYN) 500 mg tablet Take 1 tablet (500 mg total) by mouth 2 (two) times a day with meals for 5 days For pain, Starting Wed 5/8/2024, Until Mon 5/13/2024, Normal      oxybutynin (DITROPAN) 5 mg tablet Take 1 tablet (5 mg total) by mouth every 6 (six) hours as needed (bladder spasms), Starting Wed 5/8/2024, Normal      oxyCODONE (ROXICODONE) 5 immediate release tablet Take 1 tablet (5 mg total) by mouth every 4 (four) hours as needed for severe pain for up to 5 days Max Daily Amount: 30 mg, Starting Wed 5/8/2024, Until Mon 5/13/2024 at 2359, Normal      phenazopyridine (PYRIDIUM) 200 mg tablet Take 1 tablet (200 mg total) by mouth 3 (three) times a day as needed (Pain with urination) for up to 3 days, Starting Wed 5/8/2024, Until Sat 5/11/2024 at 2359, Normal      tamsulosin (FLOMAX) 0.4 mg Take 1 capsule (0.4 mg total) by mouth daily with dinner, Starting Wed 5/8/2024, Normal       !! - Potential duplicate medications found. Please discuss with provider.        Discharge Medication List as of 5/8/2024  5:24 PM        CONTINUE these medications which have NOT CHANGED    Details    !! acetaminophen (TYLENOL) 500 mg tablet Take 1 tablet (500 mg total) by mouth every 6 (six) hours as needed for mild pain or moderate pain, Starting Mon 11/21/2022, Normal      albuterol (ProAir HFA) 90 mcg/act inhaler Inhale 2 puffs every 6 (six) hours as needed for wheezing, Starting Sat 9/17/2022, Print      busPIRone (BUSPAR) 15 mg tablet Take 15 mg by mouth 2 (two) times a day, Starting Thu 2/8/2024, Historical Med      butalbital-acetaminophen-caffeine (Esgic) -40 mg per tablet Take 1 tablet by mouth every 4 (four) hours as needed for headaches, Starting Sat 11/27/2021, Normal      citalopram (CeleXA) 20 mg tablet Take 20 mg by mouth daily, Starting Thu 11/16/2023, Historical Med      diphenhydrAMINE (SOMINEX) 25 MG tablet Take 25 mg by mouth every 6 (six) hours as needed, Starting Wed 9/14/2022, Until Fri 10/14/2022 at 2359, Historical Med      doxepin (SINEquan) 75 MG capsule Take 75 mg by mouth daily at bedtime, Starting Thu 11/16/2023, Historical Med      Dulaglutide (Trulicity) 1.5 MG/0.5ML SOPN Inject 1.5 mg under the skin Once a week, Starting Sun 9/18/2022, Historical Med      famotidine (PEPCID) 20 mg tablet Take 20 mg by mouth daily at bedtime, Starting Mon 2/5/2024, Until Tue 2/4/2025, Historical Med      fluticasone (FLONASE) 50 mcg/act nasal spray 1 spray into each nostril daily, Starting Mon 11/21/2022, Normal      fluticasone (Flovent HFA) 44 mcg/act inhaler Inhale 2 puffs 2 (two) times a day, Starting Wed 6/1/2022, Until Thu 6/1/2023, Historical Med      ibuprofen (MOTRIN) 800 mg tablet Take 800 mg by mouth 2 (two) times a day as needed, Starting Thu 6/2/2022, Until Fri 6/2/2023 at 2359, Historical Med      Lantus SoloStar 100 units/mL SOPN Inject 30 Units under the skin daily, Starting Thu 3/21/2024, Historical Med      losartan (COZAAR) 25 mg tablet Take 25 mg by mouth daily, Starting Wed 6/1/2022, Until Thu 6/1/2023, Historical Med      NIFEdipine (PROCARDIA XL) 60 mg 24 hr tablet  "Take 60 mg by mouth daily, Starting Mon 2/5/2024, Historical Med      ondansetron (ZOFRAN) 8 mg tablet Take 8 mg by mouth every 12 (twelve) hours as needed, Starting Thu 6/2/2022, Historical Med      prochlorperazine (COMPAZINE) 5 mg tablet Take 5 mg by mouth every 6 (six) hours as needed, Starting Wed 9/14/2022, Until Wed 9/21/2022 at 2359, Historical Med      propranolol (INDERAL) 80 mg tablet Take 80 mg by mouth 2 (two) times a day, Starting Mon 1/27/2020, Until Tue 1/26/2021, Historical Med      rizatriptan (MAXALT) 10 mg tablet Take 10 mg by mouth, Starting Thu 6/2/2022, Until Fri 6/2/2023 at 2359, Historical Med       !! - Potential duplicate medications found. Please discuss with provider.          Allergies:  Allergies   Allergen Reactions    Pepper [Guggulipid-Black Pepper - Food Allergy] Shortness Of Breath    Metformin GI Intolerance    Cheese - Food Allergy Rash and Swelling     Swelling lips        FOLLOW-UP     PCP Outpatient Follow-up: Yes in 2 weeks    Consulting Providers Follow-up: Yes, Urology in 2 weeks     Active Issues Requiring Follow-up:   Post-op ureteral stent    Discharge Statement:   I spent 45 minutes minutes discharging the patient. This time was spent on the day of discharge. I had direct contact with the patient on the day of discharge. Additional documentation is required if more than 30 minutes were spent on discharge.    Portions of the record may have been created with voice recognition software.  Occasional wrong word or \"sound a like\" substitutions may have occurred due to the inherent limitations of voice recognition software.  Read the chart carefully and recognize, using context, where substitutions have occurred.    ==  Dawood Braxton MD    Internal Medicine Resident PGY-2        "

## 2024-05-09 NOTE — ASSESSMENT & PLAN NOTE
Patient on admission mentioned intermittent chest pain since last 2 weeks since moving furniture    Pain was reproducible on chest palpation    No clinical signs concerning for cardiac origin of chest pain    Plan  Topical analgesics as needed

## 2024-05-09 NOTE — UTILIZATION REVIEW
Initial Clinical Review    Admission: Date/Time/Statement:   Admission Orders (From admission, onward)       Ordered        05/08/24 0749  INPATIENT ADMISSION  Once                          Orders Placed This Encounter   Procedures    INPATIENT ADMISSION     Standing Status:   Standing     Number of Occurrences:   1     Order Specific Question:   Level of Care     Answer:   Med Surg [16]     Order Specific Question:   Estimated length of stay     Answer:   More than 2 Midnights     Order Specific Question:   Certification     Answer:   I certify that inpatient services are medically necessary for this patient for a duration of greater than two midnights. See H&P and MD Progress Notes for additional information about the patient's course of treatment.     ED Arrival Information       Patient not seen in ED                       No chief complaint on file.      Initial Presentation: 28 y.o. female w/ PMH of T2DM, HLD, HTN, asthma, nephrolithiasis, morbid obesity was transferred from [facility] to [facility] for a higher level of care.  Pt initially presented to [facility] w/ 2 days of right-sided flank pain associated w/ n/v and urinary frequency. Tried Tylenol w/ relief. In the ED, WBC 12.25. UA notable for occult blood with 20-30 RBCs seen on microscopy; no virgil evidence of infection otherwise. CT A/P showed a 6 mm stone at the right UPJ with moderate hydronephrosis with moderate/severe hydroureter. Also noted was hepatomegaly and hepatic steatosis. Transferred to [facility] for op intervention.  On arrival to [facility], pt reports  right flank pain radiating to groin, reproducible chest pain with palpation.     Admit as Inpatient for evaluation and treatment of calculus of ureterovesical junction.  Plan: Urology consulted. NPO midnight for potential surgical intervention tomorrow. Pain regimen: oxy 5/10, morphine 4 mg; titrate as needed. Zofran PRN for nausea control. Continue Flomax 0.4 mg daily. Continue to strain urine for  stone passage    Urology Consult: right distal ureteral calculus,  Acute renal colic 2/2 failed trial of conservative management:  Plan: OR for right ureteroscopy.     OP Note:  DATE OF PROCEDURE: 5/8/2024  PROCEDURES PERFORMED:  1) Cystoscopy  2) Right retrograde pyelography with fluoroscopic interpretation  3) Right ureteroscopy (diagnostic)  4) Right ureteral stent placement   ANESTHESIA: General    FINDINGS:  The right ureteral orifice was erythematous consistent with prior stone passage.  No stone was visible on fluoroscopy a retrograde pyelogram.  Due to the patient's continued pain I did perform ureteroscopy which was also consistent with complete passage of the ureteral stone.  The entire length of the ureter from the UVJ to the UPJ was evaluated and no stone was present.  A postop stent was left in place.    IM Notes: Patient underwent cystoscopy with pyelography which did not show any signs of a stone but did show erythema consistent with passed stone, a post-op stent was placed. She tolerated the procedure well without any acute complications. outpatient urology follow up.       ED Triage Vitals   Temperature Pulse Respirations Blood Pressure SpO2   05/08/24 0036 05/08/24 0036 05/08/24 0036 05/08/24 0036 05/08/24 0036   97.5 °F (36.4 °C) 69 16 126/75 98 %      Temp Source Heart Rate Source Patient Position - Orthostatic VS BP Location FiO2 (%)   05/08/24 0036 -- 05/08/24 0036 05/08/24 0036 --   Oral  Lying Right arm       Pain Score       05/08/24 0058       7          Wt Readings from Last 1 Encounters:   05/06/24 124 kg (272 lb 14.9 oz)     Additional Vital Signs:   Date/Time Temp Pulse Resp BP MAP (mmHg) SpO2 O2 Flow Rate (L/min) O2 Device Cardiac (WDL) Patient Position - Orthostatic VS   05/08/24 1604 97.6 °F (36.4 °C) 67 18 120/72 98 -- -- -- -- Lying   05/08/24 1530 -- 70 20 124/68 89 94 % -- None (Room air) -- --   05/08/24 1524 -- 68 16 115/69 92 94 % -- None (Room air) WDL --   05/08/24 1515 --  74 14 106/61 73 95 % -- None (Room air) -- --   05/08/24 1502 97.3 °F (36.3 °C) Abnormal  76 14 108/60 80 100 % 8 L/min Simple mask X --   05/08/24 0739 98.2 °F (36.8 °C) 58 18 115/57 82 96 % -- None (Room air) -- Lying     Pertinent Labs/Diagnostic Test Results:   FL retrograde pyelogram    (Results Pending)         Results from last 7 days   Lab Units 05/08/24  0500 05/07/24  0544 05/06/24  0537 05/06/24  0009   WBC Thousand/uL 7.27 9.48 12.08* 12.25*   HEMOGLOBIN g/dL 9.7* 9.5* 10.9* 11.4*   HEMATOCRIT % 31.4* 33.0* 36.6 37.5   PLATELETS Thousands/uL 199 189 232 234   TOTAL NEUT ABS Thousands/µL 4.29 6.20  --  9.08*         Results from last 7 days   Lab Units 05/08/24  0500 05/07/24  0544 05/06/24  0537 05/06/24  0009   SODIUM mmol/L 142 139 139 138   POTASSIUM mmol/L 3.5 3.3* 3.4* 3.6   CHLORIDE mmol/L 111* 110* 108 106   CO2 mmol/L 21 21 22 23   ANION GAP mmol/L 10 8 9 9   BUN mg/dL 14 20 20 22   CREATININE mg/dL 0.81 1.08 0.97 0.97   EGFR ml/min/1.73sq m 99 70 79 79   CALCIUM mg/dL 8.1* 8.2* 8.6 9.3   MAGNESIUM mg/dL  --  1.9  --   --    PHOSPHORUS mg/dL  --  4.0  --   --      Results from last 7 days   Lab Units 05/06/24  0009   AST U/L 26   ALT U/L 56*   ALK PHOS U/L 94   TOTAL PROTEIN g/dL 7.9   ALBUMIN g/dL 4.2   TOTAL BILIRUBIN mg/dL 0.24     Results from last 7 days   Lab Units 05/08/24  1628 05/08/24  1506 05/08/24  1058 05/08/24  0750 05/07/24  2215 05/07/24  2022 05/07/24  1545 05/07/24  1042 05/07/24  0627 05/06/24  2039 05/06/24  1541 05/06/24  1115   POC GLUCOSE mg/dl 146* 83 84 84 88 101 88 141* 95 99 98 86     Results from last 7 days   Lab Units 05/08/24  0500 05/07/24  0544 05/06/24  0537 05/06/24  0009   GLUCOSE RANDOM mg/dL 82 85 107 162*         Results from last 7 days   Lab Units 05/06/24  0537   HEMOGLOBIN A1C % 6.8*   EAG mg/dl 148         Results from last 7 days   Lab Units 05/06/24  0009   LIPASE u/L 71                 Results from last 7 days   Lab Units 05/06/24  0009   CLARITY UA   Clear   COLOR UA  Straw   SPEC GRAV UA  1.010   PH UA  7.0   GLUCOSE UA mg/dl Negative   KETONES UA mg/dl Negative   BLOOD UA  250.0*   PROTEIN UA mg/dl Negative   NITRITE UA  Negative   BILIRUBIN UA  Negative   UROBILINOGEN UA mg/dL Negative   LEUKOCYTES UA  Negative   WBC UA /hpf None Seen   RBC UA /hpf 20-30*   BACTERIA UA /hpf Occasional   EPITHELIAL CELLS WET PREP /hpf Occasional           ED Treatment:   Medication Administration - No Administrations Displayed (No Start Event Found)       None          Past Medical History:   Diagnosis Date    Abnormal CT of the head     Analgesic overuse headache     Asthma     Chronic pain of both shoulders     Diabetes mellitus (HCC)     Hyperlipidemia     Hypertension     Obesity, morbid, BMI 50 or higher (HCC)     Urethral diverticulum      Present on Admission:   Asthma   Calculus of ureterovesical junction (UVJ)   GERD (gastroesophageal reflux disease)   Hypertension, essential      Admitting Diagnosis: Calculus of ureterovesical junction (UVJ) [N20.1]  Age/Sex: 28 y.o. female  Admission Orders:  Continuous Pulse Ox  Strain all urine    Scheduled Medications:  busPIRone (BUSPAR) tablet 15 mg po bid  citalopram (CeleXA) tablet 20 mg po daily  docusate sodium (COLACE) capsule 100 mg po bid  ketorolac (TORADOL) injection 15 mg q6h IV  NIFEdipine (PROCARDIA XL) 24 hr tablet 60 mg daily po    Continuous IV Infusions:  lactated ringers infusion  Rate: 125 mL/hr Dose: 125 mL/hr  Freq: Continuous Route: IV  Indications of Use: IV Hydration  Last Dose: Stopped (05/08/24 1524)  Start: 05/08/24 1500 End: 05/08/24 1551     PRN Meds:  oxyCODONE (ROXICODONE) immediate release tablet 10 mg q4h po prn 05/08 x 2  morphine injection 4 mg q4h IV prn 05/08 x 1    None    Network Utilization Review Department  ATTENTION: Please call with any questions or concerns to [phone] and carefully listen to the prompts so that you are directed to the right person. All voicemails are  confidential.   For Discharge needs, contact Care Management DC Support Team at [phone] opt. 2  Send all requests for admission clinical reviews, approved or denied determinations and any other requests to dedicated fax number below belonging to the campus where the patient is receiving treatment. List of dedicated fax numbers for the Facilities:  FACILITY NAME UR FAX NUMBER   ADMISSION DENIALS (Administrative/Medical Necessity) [phone]   DISCHARGE SUPPORT TEAM (NETWORK) [phone]   PARENT CHILD HEALTH (Maternity/NICU/Pediatrics) [phone]   [facility] [phone]   [facility] [phone]   Critical access hospital [phone]   [facility] [phone]   [facility] [phone]   [facility] [phone]   [facility] [phone]   [facility] [phone]   [facility] [phone]   [facility] [phone]   [facility] [phone]   [facility] [phone]

## 2024-05-10 NOTE — UTILIZATION REVIEW
NOTIFICATION OF INPATIENT ADMISSION      AUTHORIZATION REQUEST   SERVICING FACILITY:   [facility]  Address: [address] [place] [address]  Tax ID: [id number]  NPI: [id number] ATTENDING PROVIDER:  Attending Name and NPI#: Tavares Silva Md [[id number]]  Address: [address] [place] [address]  Phone: [phone]   ADMISSION INFORMATION:  Place of Service: Inpatient [facility]  Place of Service Code: 21  Inpatient Admission Date/Time: 5/8/24 12:25 AM  Discharge Date/Time: 5/8/2024  6:31 PM  Admitting Diagnosis Code/Description:  Calculus of ureterovesical junction (UVJ) [N20.1]     UTILIZATION REVIEW CONTACT:  Radha Mejia Utilization   Network Utilization Review Department  Phone: [phone]  Fax: [phone]  Email: [email or web address]  Contact for approvals/pending authorizations, clinical reviews, and discharge.     PHYSICIAN ADVISORY SERVICES:  Medical Necessity Denial & Xfdv-jz-Kher Review  Phone: [phone]  Fax: [phone]  Email: [email or web address]     DISCHARGE SUPPORT TEAM:  For Patients Discharge Needs & Updates  Phone: [phone] opt. 2 Fax: [phone]  Email: [email or web address]

## 2024-05-25 ENCOUNTER — HOSPITAL ENCOUNTER (EMERGENCY)
Facility: HOSPITAL | Age: 29
Discharge: HOME | End: 2024-05-25
Attending: EMERGENCY MEDICINE
Payer: MEDICARE

## 2024-05-25 VITALS
WEIGHT: 272.49 LBS | OXYGEN SATURATION: 98 % | HEART RATE: 89 BPM | TEMPERATURE: 98.3 F | BODY MASS INDEX: 49.84 KG/M2 | DIASTOLIC BLOOD PRESSURE: 91 MMHG | RESPIRATION RATE: 18 BRPM | SYSTOLIC BLOOD PRESSURE: 144 MMHG

## 2024-05-25 DIAGNOSIS — J04.0 LARYNGITIS: Primary | ICD-10-CM

## 2024-05-25 LAB — S PYO DNA THROAT QL NAA+PROBE: NOT DETECTED

## 2024-05-25 PROCEDURE — 99284 EMERGENCY DEPT VISIT MOD MDM: CPT | Performed by: EMERGENCY MEDICINE

## 2024-05-25 PROCEDURE — 99282 EMERGENCY DEPT VISIT SF MDM: CPT

## 2024-05-25 PROCEDURE — 87651 STREP A DNA AMP PROBE: CPT | Performed by: EMERGENCY MEDICINE

## 2024-05-25 RX ORDER — DEXAMETHASONE 4 MG/1
4 TABLET ORAL ONCE
Status: COMPLETED | OUTPATIENT
Start: 2024-05-25 | End: 2024-05-25

## 2024-05-25 RX ADMIN — DEXAMETHASONE 4 MG: 4 TABLET ORAL at 22:57

## 2024-05-25 NOTE — Clinical Note
Sha Jemimaluciana was seen and treated in our emergency department on 5/25/2024.                Diagnosis:     Sha  .    She may return on this date: 05/27/2024         If you have any questions or concerns, please don't hesitate to call.      Gene Hui MD    ______________________________           _______________          _______________  Hospital Representative                              Date                                Time

## 2024-05-26 NOTE — ED PROVIDER NOTES
History  Chief Complaint   Patient presents with    Sore Throat     Sore throat x 2-3 days.  Voice hoarse.  + swollen tonsils and reddened throat.  Resps easy and regular.  BBS CTA.  Denies other c/o.       Is here because she is has a sore throat and losing her voice for last 3 days she also brought her son and to evaluate for knee pain patient denies cough runny nose vomiting diarrhea or fever says it hurts a little to swallow she does not smoke          Prior to Admission Medications   Prescriptions Last Dose Informant Patient Reported? Taking?   Dulaglutide (Trulicity) 1.5 MG/0.5ML SOPN   Yes No   Sig: Inject 1.5 mg under the skin Once a week   Lantus SoloStar 100 units/mL SOPN   Yes No   Sig: Inject 30 Units under the skin daily   NIFEdipine (PROCARDIA XL) 60 mg 24 hr tablet   Yes No   Sig: Take 60 mg by mouth daily   acetaminophen (TYLENOL) 325 mg tablet   No No   Sig: Take 2 tablets (650 mg total) by mouth every 4 (four) hours as needed for mild pain   acetaminophen (TYLENOL) 500 mg tablet   No No   Sig: Take 1 tablet (500 mg total) by mouth every 6 (six) hours as needed for mild pain or moderate pain   albuterol (ProAir HFA) 90 mcg/act inhaler   No No   Sig: Inhale 2 puffs every 6 (six) hours as needed for wheezing   busPIRone (BUSPAR) 15 mg tablet   Yes No   Sig: Take 15 mg by mouth 2 (two) times a day   butalbital-acetaminophen-caffeine (Esgic) -40 mg per tablet   No No   Sig: Take 1 tablet by mouth every 4 (four) hours as needed for headaches   citalopram (CeleXA) 20 mg tablet   Yes No   Sig: Take 20 mg by mouth daily   diphenhydrAMINE (SOMINEX) 25 MG tablet   Yes No   Sig: Take 25 mg by mouth every 6 (six) hours as needed   docusate sodium (COLACE) 100 mg capsule   No No   Sig: Take 1 capsule (100 mg total) by mouth 2 (two) times a day for 15 days   doxepin (SINEquan) 75 MG capsule   Yes No   Sig: Take 75 mg by mouth daily at bedtime   famotidine (PEPCID) 20 mg tablet   Yes No   Sig: Take 20 mg by  mouth daily at bedtime   fluticasone (FLONASE) 50 mcg/act nasal spray   No No   Si spray into each nostril daily   fluticasone (Flovent HFA) 44 mcg/act inhaler   Yes No   Sig: Inhale 2 puffs 2 (two) times a day   ibuprofen (MOTRIN) 800 mg tablet   Yes No   Sig: Take 800 mg by mouth 2 (two) times a day as needed   losartan (COZAAR) 25 mg tablet   Yes No   Sig: Take 25 mg by mouth daily   naproxen (NAPROSYN) 500 mg tablet   No No   Sig: Take 1 tablet (500 mg total) by mouth 2 (two) times a day with meals for 5 days For pain   ondansetron (ZOFRAN) 8 mg tablet   Yes No   Sig: Take 8 mg by mouth every 12 (twelve) hours as needed   oxybutynin (DITROPAN) 5 mg tablet   No No   Sig: Take 1 tablet (5 mg total) by mouth every 6 (six) hours as needed (bladder spasms)   prochlorperazine (COMPAZINE) 5 mg tablet   Yes No   Sig: Take 5 mg by mouth every 6 (six) hours as needed   propranolol (INDERAL) 80 mg tablet   Yes No   Sig: Take 80 mg by mouth 2 (two) times a day   rizatriptan (MAXALT) 10 mg tablet   Yes No   Sig: Take 10 mg by mouth   tamsulosin (FLOMAX) 0.4 mg   No No   Sig: Take 1 capsule (0.4 mg total) by mouth daily with dinner      Facility-Administered Medications: None       Past Medical History:   Diagnosis Date    Abnormal CT of the head     Analgesic overuse headache     Asthma     Chronic pain of both shoulders     Diabetes mellitus (HCC)     Hyperlipidemia     Hypertension     Obesity, morbid, BMI 50 or higher (HCC)     Urethral diverticulum        Past Surgical History:   Procedure Laterality Date     SECTION      x4    DILATION AND EVACUATION  2015    FL RETROGRADE PYELOGRAM  2024    WI CYSTO/URETERO W/LITHOTRIPSY &INDWELL STENT INSRT Right 2024    Procedure: CYSTOSCOPY URETEROSCOPY, RETROGRADE PYELOGRAM AND INSERTION STENT URETERAL;  Surgeon: Zac Sow MD;  Location: [facility];  Service: Urology    TUBAL LIGATION         Family History   Problem Relation Age of Onset     Hypertension Mother     Heart disease Mother     Diabetes Mother     Asthma Mother     Sleep apnea Father     Heart disease Father     Asthma Father     Heart failure Father     Asthma Sister     Asthma Brother     Asthma Brother     Sleep apnea Brother     Asthma Son     Asthma Son     Asthma Son     Rashes / Skin problems Son     Diabetes Maternal Grandmother     Hypertension Maternal Grandmother     Diabetes Maternal Grandfather     Cancer Maternal Aunt         ovarian    Hypertension Maternal Aunt     Arthritis Maternal Aunt     Cancer Paternal Aunt         ovarian    Heart disease Paternal Aunt     Diabetes Paternal Aunt      I have reviewed and agree with the history as documented.    E-Cigarette/Vaping    E-Cigarette Use Former User      E-Cigarette/Vaping Substances    Nicotine No     THC No     CBD No     Flavoring No     Other No     Unknown No      Social History     Tobacco Use    Smoking status: Former    Smokeless tobacco: Never   Vaping Use    Vaping status: Former   Substance Use Topics    Alcohol use: Yes    Drug use: Not Currently     Types: Marijuana       Review of Systems   Constitutional:  Negative for chills and fever.   HENT:  Positive for sore throat. Negative for ear pain and rhinorrhea.    Respiratory:  Negative for cough and shortness of breath.    Cardiovascular:  Negative for chest pain and palpitations.   Gastrointestinal:  Negative for abdominal pain and vomiting.   Musculoskeletal:  Negative for arthralgias and neck pain.   Skin:  Negative for color change and rash.   Neurological:  Negative for seizures and syncope.   All other systems reviewed and are negative.      Physical Exam  Physical Exam  Vitals and nursing note reviewed.   Constitutional:       General: She is not in acute distress.     Appearance: She is well-developed.   HENT:      Head: Normocephalic and atraumatic.      Nose: Nose normal.      Mouth/Throat:      Mouth: Mucous membranes are moist.      Pharynx: Posterior  oropharyngeal erythema present. No oropharyngeal exudate.      Comments: Steer pharynx mild erythema no exudate uvula is midline no trismus voice is hoarse  Eyes:      Extraocular Movements: Extraocular movements intact.      Conjunctiva/sclera: Conjunctivae normal.   Cardiovascular:      Rate and Rhythm: Normal rate and regular rhythm.      Heart sounds: No murmur heard.  Pulmonary:      Effort: Pulmonary effort is normal. No respiratory distress.      Breath sounds: Normal breath sounds. No stridor. No wheezing, rhonchi or rales.   Abdominal:      Palpations: Abdomen is soft.      Tenderness: There is no abdominal tenderness.   Musculoskeletal:         General: No swelling.      Cervical back: Normal range of motion and neck supple. No rigidity or tenderness.   Lymphadenopathy:      Cervical: No cervical adenopathy.   Skin:     General: Skin is warm and dry.      Capillary Refill: Capillary refill takes less than 2 seconds.   Neurological:      General: No focal deficit present.      Mental Status: She is alert.   Psychiatric:         Mood and Affect: Mood normal.         Vital Signs  ED Triage Vitals   Temperature Pulse Respirations Blood Pressure SpO2   05/25/24 2234 05/25/24 2238 05/25/24 2238 05/25/24 2238 05/25/24 2238   98.3 °F (36.8 °C) 89 18 (!) 180/103 98 %      Temp Source Heart Rate Source Patient Position - Orthostatic VS BP Location FiO2 (%)   05/25/24 2234 05/25/24 2238 05/25/24 2238 05/25/24 2238 --   Oral Monitor Sitting Right arm       Pain Score       --                  Vitals:    05/25/24 2238   BP: (!) 180/103   Pulse: 89   Patient Position - Orthostatic VS: Sitting         Visual Acuity      ED Medications  Medications   dexamethasone (DECADRON) tablet 4 mg (has no administration in time range)       Diagnostic Studies  Results Reviewed       Procedure Component Value Units Date/Time    Strep A PCR [[id number]] Collected: 05/25/24 2239    Lab Status: No result Specimen: Throat                     No orders to display              Procedures  Procedures         ED Course                               SBIRT 22yo+      Flowsheet Row Most Recent Value   Initial Alcohol Screen: US AUDIT-C     1. How often do you have a drink containing alcohol? 0 Filed at: 05/25/2024 2235   2. How many drinks containing alcohol do you have on a typical day you are drinking?  0 Filed at: 05/25/2024 2235   3b. FEMALE Any Age, or MALE 65+: How often do you have 4 or more drinks on one occassion? 0 Filed at: 05/25/2024 2235   Audit-C Score 0 Filed at: 05/25/2024 2235   AYAAN: How many times in the past year have you...    Used an illegal drug or used a prescription medication for non-medical reasons? Never Filed at: 05/25/2024 2235                      Medical Decision Making  Test sent patient 1 out of 4 [facility]'s criteria for strep await strep test most likely with the losing her voice this is viral laryngitis no airway compromise patient is felt safe for discharge     Amount and/or Complexity of Data Reviewed  Labs: ordered.             Disposition  Final diagnoses:   Laryngitis     Time reflects when diagnosis was documented in both MDM as applicable and the Disposition within this note       Time User Action Codes Description Comment    5/25/2024 10:44 PM Gene Hui Add [J04.0] Laryngitis           ED Disposition       ED Disposition   Discharge    Condition   Stable    Date/Time   Sat May 25, 2024 2244    Comment   Sha Muñoz discharge to home/self care.                   Follow-up Information       Follow up With Specialties Details Why Contact Info    [facility] In 3 days  [address] [facility] [address]  [phone]              Patient's Medications   Discharge Prescriptions    No medications on file       No discharge procedures on file.    PDMP Review       None            ED Provider  Electronically Signed by             Gene Hui MD  05/25/24 2248

## (undated) DEVICE — CHLORHEXIDINE 4PCT 4 OZ

## (undated) DEVICE — INVIEW CLEAR LEGGINGS: Brand: CONVERTORS

## (undated) DEVICE — PREMIUM DRY TRAY LF: Brand: MEDLINE INDUSTRIES, INC.

## (undated) DEVICE — GUIDEWIRE STRGHT TIP 0.035 IN  SOLO PLUS

## (undated) DEVICE — GLOVE SRG BIOGEL 7

## (undated) DEVICE — 3M™ TEGADERM™ TRANSPARENT FILM DRESSING FRAME STYLE, 1624W, 2-3/8 IN X 2-3/4 IN (6 CM X 7 CM), 100/CT 4CT/CASE: Brand: 3M™ TEGADERM™

## (undated) DEVICE — PACK TUR

## (undated) DEVICE — SYRINGE 10ML LL

## (undated) DEVICE — CATH URETERAL 5FR X 70 CM FLEX TIP POLYUR BARD

## (undated) DEVICE — SPECIMEN CONTAINER STERILE PEEL PACK